# Patient Record
Sex: MALE | Race: WHITE | NOT HISPANIC OR LATINO | Employment: FULL TIME | URBAN - METROPOLITAN AREA
[De-identification: names, ages, dates, MRNs, and addresses within clinical notes are randomized per-mention and may not be internally consistent; named-entity substitution may affect disease eponyms.]

---

## 2019-09-18 ENCOUNTER — OFFICE VISIT (OUTPATIENT)
Dept: URGENT CARE | Facility: CLINIC | Age: 34
End: 2019-09-18
Payer: COMMERCIAL

## 2019-09-18 VITALS
SYSTOLIC BLOOD PRESSURE: 104 MMHG | BODY MASS INDEX: 21.48 KG/M2 | HEART RATE: 110 BPM | OXYGEN SATURATION: 100 % | HEIGHT: 69 IN | TEMPERATURE: 99 F | DIASTOLIC BLOOD PRESSURE: 70 MMHG | RESPIRATION RATE: 18 BRPM | WEIGHT: 145 LBS

## 2019-09-18 DIAGNOSIS — M62.838 MUSCLE SPASM: ICD-10-CM

## 2019-09-18 DIAGNOSIS — M62.838 CERVICAL PARASPINAL MUSCLE SPASM: Primary | ICD-10-CM

## 2019-09-18 PROCEDURE — 3008F BODY MASS INDEX DOCD: CPT | Mod: CPTII,S$GLB,, | Performed by: NURSE PRACTITIONER

## 2019-09-18 PROCEDURE — 99203 OFFICE O/P NEW LOW 30 MIN: CPT | Mod: 25,S$GLB,, | Performed by: NURSE PRACTITIONER

## 2019-09-18 PROCEDURE — 99203 PR OFFICE/OUTPT VISIT, NEW, LEVL III, 30-44 MIN: ICD-10-PCS | Mod: 25,S$GLB,, | Performed by: NURSE PRACTITIONER

## 2019-09-18 PROCEDURE — 96372 THER/PROPH/DIAG INJ SC/IM: CPT | Mod: S$GLB,,, | Performed by: NURSE PRACTITIONER

## 2019-09-18 PROCEDURE — 3008F PR BODY MASS INDEX (BMI) DOCUMENTED: ICD-10-PCS | Mod: CPTII,S$GLB,, | Performed by: NURSE PRACTITIONER

## 2019-09-18 PROCEDURE — 96372 PR INJECTION,THERAP/PROPH/DIAG2ST, IM OR SUBCUT: ICD-10-PCS | Mod: S$GLB,,, | Performed by: NURSE PRACTITIONER

## 2019-09-18 RX ORDER — METHOCARBAMOL 750 MG/1
750 TABLET, FILM COATED ORAL 2 TIMES DAILY PRN
Qty: 30 TABLET | Refills: 0 | Status: SHIPPED | OUTPATIENT
Start: 2019-09-18 | End: 2019-10-03

## 2019-09-18 RX ORDER — KETOROLAC TROMETHAMINE 30 MG/ML
30 INJECTION, SOLUTION INTRAMUSCULAR; INTRAVENOUS
Status: COMPLETED | OUTPATIENT
Start: 2019-09-18 | End: 2019-09-18

## 2019-09-18 RX ORDER — NAPROXEN 500 MG/1
500 TABLET ORAL 2 TIMES DAILY WITH MEALS
Qty: 20 TABLET | Refills: 0 | Status: ON HOLD | OUTPATIENT
Start: 2019-09-18 | End: 2019-09-22 | Stop reason: HOSPADM

## 2019-09-18 RX ADMIN — KETOROLAC TROMETHAMINE 30 MG: 30 INJECTION, SOLUTION INTRAMUSCULAR; INTRAVENOUS at 04:09

## 2019-09-18 NOTE — PATIENT INSTRUCTIONS
INCREASE FLUID INTAKE   MUSCLE RELAXER MAY CAUSE YOU TO BE DROWSY    You must understand that you've received an Urgent Care treatment only and that you may be released before all your medical problems are known or treated. You, the patient, will arrange for follow up care as instructed.  If your condition worsens we recommend that you receive another evaluation at the emergency room immediately or contact your primary medical clinics after hours call service to discuss your concerns.  Please return here or go to the Emergency Department for any concerns or worsening of condition.     Muscle Spasm  A muscle spasm is a sudden tightening of the muscle you cant control. This may be caused by strain, overworking the muscle, or injury. It can also be caused by dehydration, electrolyte imbalance, diabetes, alcohol use, and certain medicines. If it goes on long enough the muscle spasm causes pain. Common areas for muscle spasm are the legs, neck, and back.  Home care  · Heat, massage, and stretching will help relax muscle spasm.  · When the spasm is in your arm or leg, stretch the muscle passively. To do this, have someone bend or straighten the joint above or below the muscle until you feel the stretch on the sore muscle. You can stretch the muscle actively by moving the affected body part. This will stretch the muscle that is in spasm. For example, if the spasm is in your calf, bend the ankle so your toes point upward toward your knee. This will stretch your calf muscle.  · You may use over-the-counter pain medicine to control pain, unless another medicine was prescribed. If you have chronic liver or kidney disease or ever had a stomach ulcer or GI bleeding, talk with your healthcare provider before using these medicines.  Follow-up care  Follow up with your healthcare provider, or as advised.    When to seek medical advice  Call your healthcare provider right away if any of the following occur:  · Fingers or toes  become swollen, cold, blue, numb, or tingly  · You develop weakness in the affected arm or leg  · Pain increases and is not controlled by the above measures  Date Last Reviewed: 11/21/2015  © 2797-8873 [employer]. [address] [place] [address], [place] [address]. All rights reserved. This information is not intended as a substitute for professional medical care. Always follow your healthcare professional's instructions.

## 2019-09-18 NOTE — PROGRESS NOTES
"Subjective:       Patient ID: Eleazar Casillas is a 33 y.o. male.    Vitals:  height is 5' 9" (1.753 m) and weight is 65.8 kg (145 lb). His temperature is 98.6 °F (37 °C). His blood pressure is 104/70 and his pulse is 110. His respiration is 18 and oxygen saturation is 100%.     Chief Complaint: Shoulder Pain    Left shoulder pain, started on Monday, no trauma  This is a 33-year-old male presents today with complaints of left shoulder pain. He states his noticed his bicep twitching and feels very tense.  Patient states pain is constant but worsens intermittently.  He was seen by his chiropractor yesterday for this pain, x-rays were performed.  X-ray was normal.  Patient was told pain was from a strain of his shoulder.  Denies any or tingling.  Denies any recent heavy lifting or straining.  Denies any known trauma.    Shoulder Pain    The pain is present in the left shoulder. This is a new problem. The current episode started in the past 7 days. The problem occurs constantly. The problem has been unchanged. The quality of the pain is described as burning and sharp. The pain is at a severity of 10/10. The pain is severe. Pertinent negatives include no fever or headaches. The symptoms are aggravated by activity. He has tried acetaminophen and OTC pain meds for the symptoms. The treatment provided mild relief.       Constitution: Negative for chills, fatigue and fever.   HENT: Negative for congestion and sore throat.    Neck: Negative for painful lymph nodes.   Cardiovascular: Negative for chest pain and leg swelling.   Eyes: Negative for double vision and blurred vision.   Respiratory: Negative for cough and shortness of breath.    Gastrointestinal: Negative for nausea, vomiting and diarrhea.   Genitourinary: Negative for dysuria, frequency and urgency.   Musculoskeletal: Negative for joint pain, joint swelling, muscle cramps and muscle ache.   Skin: Negative for color change, pale, rash and erythema. "   Allergic/Immunologic: Negative for seasonal allergies.   Neurological: Negative for dizziness, history of vertigo, light-headedness, passing out and headaches.   Hematologic/Lymphatic: Negative for swollen lymph nodes, easy bruising/bleeding and history of blood clots. Does not bruise/bleed easily.   Psychiatric/Behavioral: Negative for nervous/anxious, sleep disturbance and depression. The patient is not nervous/anxious.        Objective:      Physical Exam   Constitutional: He is oriented to person, place, and time. He appears well-developed and well-nourished.   Patient sitting uncomfortably in chair   HENT:   Head: Normocephalic and atraumatic. Head is without abrasion, without contusion and without laceration.   Right Ear: External ear normal.   Left Ear: External ear normal.   Nose: Nose normal.   Mouth/Throat: Oropharynx is clear and moist.   Eyes: Pupils are equal, round, and reactive to light. Conjunctivae, EOM and lids are normal.   Neck: Trachea normal, full passive range of motion without pain and phonation normal. Neck supple.   Cardiovascular: Normal rate, regular rhythm and normal heart sounds.   Pulmonary/Chest: Effort normal and breath sounds normal. No stridor. No respiratory distress.   Musculoskeletal: Normal range of motion.        Left shoulder: He exhibits tenderness, pain and spasm.        Cervical back: He exhibits tenderness, pain and spasm. He exhibits normal range of motion, no bony tenderness, no swelling, no edema, no deformity, no laceration and normal pulse.        Back:         Arms:  5/5 hand strength  Radial pulse +2  Cap refill <3 seconds   Neurological: He is alert and oriented to person, place, and time.   Skin: Skin is warm, dry and intact. Capillary refill takes less than 2 seconds. No abrasion, no bruising, no burn, no ecchymosis, no laceration, no lesion and no rash noted. No erythema.   No lesions, bruising, or ecchymosis   Psychiatric: He has a normal mood and affect.  His speech is normal and behavior is normal. Judgment and thought content normal. Cognition and memory are normal.   Nursing note and vitals reviewed.      Assessment:       1. Cervical paraspinal muscle spasm    2. Muscle spasm        Plan:         Cervical paraspinal muscle spasm  -     ketorolac injection 30 mg  -     naproxen (NAPROSYN) 500 MG tablet; Take 1 tablet (500 mg total) by mouth 2 (two) times daily with meals. for 10 days  Dispense: 20 tablet; Refill: 0  -     methocarbamol (ROBAXIN) 750 MG Tab; Take 1 tablet (750 mg total) by mouth 2 (two) times daily as needed.  Dispense: 30 tablet; Refill: 0    Muscle spasm            Patient Instructions   INCREASE FLUID INTAKE   MUSCLE RELAXER MAY CAUSE YOU TO BE DROWSY    You must understand that you've received an Urgent Care treatment only and that you may be released before all your medical problems are known or treated. You, the patient, will arrange for follow up care as instructed.  If your condition worsens we recommend that you receive another evaluation at the emergency room immediately or contact your primary medical clinics after hours call service to discuss your concerns.  Please return here or go to the Emergency Department for any concerns or worsening of condition.     Muscle Spasm  A muscle spasm is a sudden tightening of the muscle you cant control. This may be caused by strain, overworking the muscle, or injury. It can also be caused by dehydration, electrolyte imbalance, diabetes, alcohol use, and certain medicines. If it goes on long enough the muscle spasm causes pain. Common areas for muscle spasm are the legs, neck, and back.  Home care  · Heat, massage, and stretching will help relax muscle spasm.  · When the spasm is in your arm or leg, stretch the muscle passively. To do this, have someone bend or straighten the joint above or below the muscle until you feel the stretch on the sore muscle. You can stretch the muscle actively by moving  the affected body part. This will stretch the muscle that is in spasm. For example, if the spasm is in your calf, bend the ankle so your toes point upward toward your knee. This will stretch your calf muscle.  · You may use over-the-counter pain medicine to control pain, unless another medicine was prescribed. If you have chronic liver or kidney disease or ever had a stomach ulcer or GI bleeding, talk with your healthcare provider before using these medicines.  Follow-up care  Follow up with your healthcare provider, or as advised.    When to seek medical advice  Call your healthcare provider right away if any of the following occur:  · Fingers or toes become swollen, cold, blue, numb, or tingly  · You develop weakness in the affected arm or leg  · Pain increases and is not controlled by the above measures  Date Last Reviewed: 11/21/2015  © 2969-4364 [employer]. [address] [place] [address]. All rights reserved. This information is not intended as a substitute for professional medical care. Always follow your healthcare professional's instructions.

## 2019-09-19 ENCOUNTER — TELEPHONE (OUTPATIENT)
Dept: URGENT CARE | Facility: CLINIC | Age: 34
End: 2019-09-19

## 2019-09-19 NOTE — TELEPHONE ENCOUNTER
Patient called stating that he is still having really bad spasms to his left shoulder. He states the muscle relaxer's help momentarily but come back quickly.  He has also been increasing his fluid intake and electrolytes without improvement.  Discussed case with Dr. Soni and recommends patient go to the ER for further workup of his symptoms.  Discussed plan of care with patient and he verbalizes understanding.

## 2019-09-20 ENCOUNTER — HOSPITAL ENCOUNTER (INPATIENT)
Facility: HOSPITAL | Age: 34
LOS: 2 days | Discharge: HOME | DRG: 473 | End: 2019-09-22
Attending: EMERGENCY MEDICINE | Admitting: NEUROLOGICAL SURGERY
Payer: COMMERCIAL

## 2019-09-20 DIAGNOSIS — M50.222 HERNIATION OF INTERVERTEBRAL DISC AT C5-C6 LEVEL: ICD-10-CM

## 2019-09-20 DIAGNOSIS — Z01.818 PREOPERATIVE CLEARANCE: ICD-10-CM

## 2019-09-20 DIAGNOSIS — G95.19: Primary | ICD-10-CM

## 2019-09-20 DIAGNOSIS — M50.30 BULGING OF CERVICAL INTERVERTEBRAL DISC: ICD-10-CM

## 2019-09-20 DIAGNOSIS — M62.838 CERVICAL PARASPINAL MUSCLE SPASM: ICD-10-CM

## 2019-09-20 DIAGNOSIS — Z72.0 TOBACCO ABUSE: ICD-10-CM

## 2019-09-20 DIAGNOSIS — M50.20 PROTRUDED CERVICAL DISC: ICD-10-CM

## 2019-09-20 DIAGNOSIS — M62.838 MUSCLE SPASM: ICD-10-CM

## 2019-09-20 LAB
ABO + RH BLD: NORMAL
ANION GAP SERPL CALC-SCNC: 8 MMOL/L (ref 8–16)
APTT BLDCRRT: 26.1 SEC (ref 21–32)
BASOPHILS # BLD AUTO: 0.11 K/UL (ref 0–0.2)
BASOPHILS NFR BLD: 1.5 % (ref 0–1.9)
BLD GP AB SCN CELLS X3 SERPL QL: NORMAL
BLOOD GROUP ANTIBODIES SERPL: NORMAL
BUN SERPL-MCNC: 14 MG/DL (ref 6–20)
CALCIUM SERPL-MCNC: 9.4 MG/DL (ref 8.7–10.5)
CHLORIDE SERPL-SCNC: 106 MMOL/L (ref 95–110)
CO2 SERPL-SCNC: 26 MMOL/L (ref 23–29)
CREAT SERPL-MCNC: 1 MG/DL (ref 0.5–1.4)
CRP SERPL-MCNC: 0.5 MG/L (ref 0–8.2)
DIFFERENTIAL METHOD: ABNORMAL
EOSINOPHIL # BLD AUTO: 0.6 K/UL (ref 0–0.5)
EOSINOPHIL NFR BLD: 8.4 % (ref 0–8)
ERYTHROCYTE [DISTWIDTH] IN BLOOD BY AUTOMATED COUNT: 13.6 % (ref 11.5–14.5)
ERYTHROCYTE [SEDIMENTATION RATE] IN BLOOD BY WESTERGREN METHOD: 4 MM/HR (ref 0–23)
EST. GFR  (AFRICAN AMERICAN): >60 ML/MIN/1.73 M^2
EST. GFR  (NON AFRICAN AMERICAN): >60 ML/MIN/1.73 M^2
GLUCOSE SERPL-MCNC: 108 MG/DL (ref 70–110)
HCT VFR BLD AUTO: 45.5 % (ref 40–54)
HGB BLD-MCNC: 15.1 G/DL (ref 14–18)
IMM GRANULOCYTES # BLD AUTO: 0.02 K/UL (ref 0–0.04)
IMM GRANULOCYTES NFR BLD AUTO: 0.3 % (ref 0–0.5)
INR PPP: 1 (ref 0.8–1.2)
LYMPHOCYTES # BLD AUTO: 2 K/UL (ref 1–4.8)
LYMPHOCYTES NFR BLD: 28 % (ref 18–48)
MCH RBC QN AUTO: 29.2 PG (ref 27–31)
MCHC RBC AUTO-ENTMCNC: 33.2 G/DL (ref 32–36)
MCV RBC AUTO: 88 FL (ref 82–98)
MONOCYTES # BLD AUTO: 0.5 K/UL (ref 0.3–1)
MONOCYTES NFR BLD: 6.6 % (ref 4–15)
NEUTROPHILS # BLD AUTO: 3.9 K/UL (ref 1.8–7.7)
NEUTROPHILS NFR BLD: 55.2 % (ref 38–73)
NRBC BLD-RTO: 0 /100 WBC
PLATELET # BLD AUTO: 221 K/UL (ref 150–350)
PMV BLD AUTO: 10.6 FL (ref 9.2–12.9)
POTASSIUM SERPL-SCNC: 4.6 MMOL/L (ref 3.5–5.1)
PROTHROMBIN TIME: 10.7 SEC (ref 9–12.5)
RBC # BLD AUTO: 5.18 M/UL (ref 4.6–6.2)
SODIUM SERPL-SCNC: 140 MMOL/L (ref 136–145)
WBC # BLD AUTO: 7.14 K/UL (ref 3.9–12.7)

## 2019-09-20 PROCEDURE — 20600001 HC STEP DOWN PRIVATE ROOM

## 2019-09-20 PROCEDURE — 99285 EMERGENCY DEPT VISIT HI MDM: CPT | Mod: ,,, | Performed by: EMERGENCY MEDICINE

## 2019-09-20 PROCEDURE — 25000003 PHARM REV CODE 250: Performed by: EMERGENCY MEDICINE

## 2019-09-20 PROCEDURE — 63600175 PHARM REV CODE 636 W HCPCS: Performed by: PHYSICIAN ASSISTANT

## 2019-09-20 PROCEDURE — 80048 BASIC METABOLIC PNL TOTAL CA: CPT

## 2019-09-20 PROCEDURE — 99254 PR INITIAL INPATIENT CONSULT,LEVL IV: ICD-10-PCS | Mod: ,,, | Performed by: PHYSICIAN ASSISTANT

## 2019-09-20 PROCEDURE — 85610 PROTHROMBIN TIME: CPT

## 2019-09-20 PROCEDURE — 96375 TX/PRO/DX INJ NEW DRUG ADDON: CPT

## 2019-09-20 PROCEDURE — 99285 PR EMERGENCY DEPT VISIT,LEVEL V: ICD-10-PCS | Mod: ,,, | Performed by: EMERGENCY MEDICINE

## 2019-09-20 PROCEDURE — 99254 IP/OBS CNSLTJ NEW/EST MOD 60: CPT | Mod: ,,, | Performed by: PHYSICIAN ASSISTANT

## 2019-09-20 PROCEDURE — 63600175 PHARM REV CODE 636 W HCPCS: Performed by: EMERGENCY MEDICINE

## 2019-09-20 PROCEDURE — 99285 EMERGENCY DEPT VISIT HI MDM: CPT | Mod: 25

## 2019-09-20 PROCEDURE — 86901 BLOOD TYPING SEROLOGIC RH(D): CPT

## 2019-09-20 PROCEDURE — 96374 THER/PROPH/DIAG INJ IV PUSH: CPT

## 2019-09-20 PROCEDURE — 85025 COMPLETE CBC W/AUTO DIFF WBC: CPT

## 2019-09-20 PROCEDURE — 85652 RBC SED RATE AUTOMATED: CPT

## 2019-09-20 PROCEDURE — 86870 RBC ANTIBODY IDENTIFICATION: CPT

## 2019-09-20 PROCEDURE — 93005 ELECTROCARDIOGRAM TRACING: CPT

## 2019-09-20 PROCEDURE — 85730 THROMBOPLASTIN TIME PARTIAL: CPT

## 2019-09-20 PROCEDURE — 86140 C-REACTIVE PROTEIN: CPT

## 2019-09-20 PROCEDURE — 93010 EKG 12-LEAD: ICD-10-PCS | Mod: ,,, | Performed by: INTERNAL MEDICINE

## 2019-09-20 PROCEDURE — 93010 ELECTROCARDIOGRAM REPORT: CPT | Mod: ,,, | Performed by: INTERNAL MEDICINE

## 2019-09-20 PROCEDURE — 25000003 PHARM REV CODE 250: Performed by: PHYSICIAN ASSISTANT

## 2019-09-20 RX ORDER — IBUPROFEN 200 MG
16 TABLET ORAL
Status: DISCONTINUED | OUTPATIENT
Start: 2019-09-20 | End: 2019-09-22 | Stop reason: HOSPADM

## 2019-09-20 RX ORDER — MORPHINE SULFATE 4 MG/ML
1 INJECTION, SOLUTION INTRAMUSCULAR; INTRAVENOUS EVERY 6 HOURS PRN
Status: DISCONTINUED | OUTPATIENT
Start: 2019-09-20 | End: 2019-09-21

## 2019-09-20 RX ORDER — IBUPROFEN 200 MG
24 TABLET ORAL
Status: DISCONTINUED | OUTPATIENT
Start: 2019-09-20 | End: 2019-09-22 | Stop reason: HOSPADM

## 2019-09-20 RX ORDER — HYDROCODONE BITARTRATE AND ACETAMINOPHEN 5; 325 MG/1; MG/1
1 TABLET ORAL EVERY 4 HOURS PRN
Status: DISCONTINUED | OUTPATIENT
Start: 2019-09-20 | End: 2019-09-21

## 2019-09-20 RX ORDER — DIAZEPAM 5 MG/1
5 TABLET ORAL
Status: COMPLETED | OUTPATIENT
Start: 2019-09-20 | End: 2019-09-20

## 2019-09-20 RX ORDER — KETOROLAC TROMETHAMINE 30 MG/ML
30 INJECTION, SOLUTION INTRAMUSCULAR; INTRAVENOUS
Status: COMPLETED | OUTPATIENT
Start: 2019-09-20 | End: 2019-09-20

## 2019-09-20 RX ORDER — NICOTINE 7MG/24HR
1 PATCH, TRANSDERMAL 24 HOURS TRANSDERMAL DAILY
Status: DISCONTINUED | OUTPATIENT
Start: 2019-09-21 | End: 2019-09-22 | Stop reason: HOSPADM

## 2019-09-20 RX ORDER — GLUCAGON 1 MG
1 KIT INJECTION
Status: DISCONTINUED | OUTPATIENT
Start: 2019-09-20 | End: 2019-09-22 | Stop reason: HOSPADM

## 2019-09-20 RX ORDER — BUPRENORPHINE HYDROCHLORIDE AND NALOXONE HYDROCHLORIDE DIHYDRATE 8; 2 MG/1; MG/1
TABLET SUBLINGUAL EVERY 6 HOURS PRN
COMMUNITY
End: 2024-03-06

## 2019-09-20 RX ORDER — ONDANSETRON 2 MG/ML
4 INJECTION INTRAMUSCULAR; INTRAVENOUS
Status: COMPLETED | OUTPATIENT
Start: 2019-09-20 | End: 2019-09-20

## 2019-09-20 RX ORDER — AMOXICILLIN 250 MG
2 CAPSULE ORAL NIGHTLY PRN
Status: DISCONTINUED | OUTPATIENT
Start: 2019-09-20 | End: 2019-09-22 | Stop reason: HOSPADM

## 2019-09-20 RX ORDER — SODIUM CHLORIDE 9 MG/ML
INJECTION, SOLUTION INTRAVENOUS CONTINUOUS
Status: DISCONTINUED | OUTPATIENT
Start: 2019-09-21 | End: 2019-09-22 | Stop reason: HOSPADM

## 2019-09-20 RX ORDER — ONDANSETRON 2 MG/ML
4 INJECTION INTRAMUSCULAR; INTRAVENOUS EVERY 6 HOURS PRN
Status: DISCONTINUED | OUTPATIENT
Start: 2019-09-20 | End: 2019-09-22 | Stop reason: HOSPADM

## 2019-09-20 RX ORDER — HYDROMORPHONE HYDROCHLORIDE 1 MG/ML
0.5 INJECTION, SOLUTION INTRAMUSCULAR; INTRAVENOUS; SUBCUTANEOUS
Status: COMPLETED | OUTPATIENT
Start: 2019-09-20 | End: 2019-09-20

## 2019-09-20 RX ORDER — CYCLOBENZAPRINE HCL 5 MG
5 TABLET ORAL 3 TIMES DAILY PRN
Status: DISCONTINUED | OUTPATIENT
Start: 2019-09-20 | End: 2019-09-21

## 2019-09-20 RX ORDER — ACETAMINOPHEN 325 MG/1
650 TABLET ORAL EVERY 4 HOURS PRN
Status: DISCONTINUED | OUTPATIENT
Start: 2019-09-20 | End: 2019-09-22 | Stop reason: HOSPADM

## 2019-09-20 RX ADMIN — HYDROMORPHONE HYDROCHLORIDE 0.5 MG: 1 INJECTION, SOLUTION INTRAMUSCULAR; INTRAVENOUS; SUBCUTANEOUS at 05:09

## 2019-09-20 RX ADMIN — DIAZEPAM 5 MG: 5 TABLET ORAL at 09:09

## 2019-09-20 RX ADMIN — KETOROLAC TROMETHAMINE 30 MG: 30 INJECTION, SOLUTION INTRAMUSCULAR; INTRAVENOUS at 09:09

## 2019-09-20 RX ADMIN — MORPHINE SULFATE 1 MG: 4 INJECTION INTRAVENOUS at 09:09

## 2019-09-20 RX ADMIN — HYDROCODONE BITARTRATE AND ACETAMINOPHEN 1 TABLET: 5; 325 TABLET ORAL at 07:09

## 2019-09-20 RX ADMIN — CYCLOBENZAPRINE HYDROCHLORIDE 5 MG: 5 TABLET, FILM COATED ORAL at 07:09

## 2019-09-20 RX ADMIN — HYDROMORPHONE HYDROCHLORIDE 0.5 MG: 1 INJECTION, SOLUTION INTRAMUSCULAR; INTRAVENOUS; SUBCUTANEOUS at 03:09

## 2019-09-20 RX ADMIN — ONDANSETRON 4 MG: 2 INJECTION INTRAMUSCULAR; INTRAVENOUS at 03:09

## 2019-09-20 NOTE — ASSESSMENT & PLAN NOTE
34 y/o M with no significant PMH who present with 4 days of L-sided neck and arm pain, found to have C5-6 disc herniation and associated cord signal change.    -MRI C-spine reviewed: L paracentral disc herniation causing moderate spinal cord effacement with associated cord signal change. Straightening of cervical spine also noted.  -Admit patient to Neurosurgery  -No emergent neurosurgical intervention at this time. However, patient would benefit from cervical decompression in the near future due to the fact that he is already experiencing functional loss and with signs of cord edema.  -Neuro checks and vital signs q4h  -Preop labs, CXR, and EKG ordered.  -Regular diet now, NPO at midnight  -Pain control: Norco PRN, morphine IV for pain not relieved by PO medication  -Muscle spasms: Flexeril PRN  -Tobacco Use: Nicotine patch ordered  -Plan discussed with patient and family at bedside at length, voiced understanding and agreement to plan for admission and possible surgical intervention in near future. All questions were answered.  -Discussed with Dr. Morris

## 2019-09-20 NOTE — SUBJECTIVE & OBJECTIVE
(Not in a hospital admission)    Review of patient's allergies indicates:  No Known Allergies    History reviewed. No pertinent past medical history.  History reviewed. No pertinent surgical history.  Family History     None        Tobacco Use    Smoking status: Current Every Day Smoker     Packs/day: 1.00     Types: Cigarettes    Smokeless tobacco: Never Used   Substance and Sexual Activity    Alcohol use: Not Currently    Drug use: Not Currently     Comment: suboxoe intermittently     Sexual activity: Not on file     Review of Systems   Constitutional: Negative for chills and fever.   Eyes: Negative for photophobia and visual disturbance.   Respiratory: Negative for cough and shortness of breath.    Cardiovascular: Negative for chest pain and palpitations.   Gastrointestinal: Negative for nausea and vomiting.   Genitourinary: Negative for difficulty urinating and dysuria.   Musculoskeletal: Positive for myalgias and neck pain. Negative for arthralgias and gait problem.   Skin: Negative for pallor and rash.   Neurological: Positive for numbness. Negative for tremors, seizures, syncope, weakness and headaches.   Hematological: Negative for adenopathy. Does not bruise/bleed easily.   Psychiatric/Behavioral: Negative for behavioral problems and confusion.     Objective:     Weight: 65.8 kg (145 lb)  Body mass index is 20.81 kg/m².  Vital Signs (Most Recent):  Temp: 97.3 °F (36.3 °C) (09/20/19 1605)  Pulse: 85 (09/20/19 1605)  Resp: 18 (09/20/19 1110)  BP: 108/61 (09/20/19 1605)  SpO2: 100 % (09/20/19 1605) Vital Signs (24h Range):  Temp:  [97.3 °F (36.3 °C)] 97.3 °F (36.3 °C)  Pulse:  [] 85  Resp:  [18] 18  SpO2:  [100 %] 100 %  BP: ()/(59-72) 108/61                          Neurosurgery Physical Exam    General: well developed, well nourished, no distress.   Head: normocephalic, atraumatic  Neurologic: Alert and oriented. Thought content appropriate.  GCS: Motor: 6/Verbal: 5/Eyes: 4 GCS Total:  15  Mental Status: Awake, Alert, Oriented x 4  Language: No aphasia  Speech: No dysarthria  Cranial nerves: face symmetric, tongue midline, CN II-XII grossly intact.   Eyes: pupils equal, round, reactive to light with accomodation, EOMI.   Pulmonary: normal respirations, no signs of respiratory distress  Abdomen: soft, non-distended, not tender to palpation  Skin: Skin is warm, dry and intact.  Sensory: Decreased sensation to L first three digits, o/w intact to light touch throughout    Motor Strength: Moves all extremities spontaneously with good tone. L bicep 4+/5, o/w full strength upper and lower extremities. No abnormal movements seen.     Strength  Deltoids Triceps Biceps Wrist Extension Wrist Flexion Hand    Upper: R 5/5 5/5 5/5 5/5 5/5 5/5    L 5/5 5/5 4+/5 5/5 5/5 5/5     Iliopsoas Quadriceps Knee  Flexion Tibialis  anterior Gastro- cnemius EHL   Lower: R 5/5 5/5 5/5 5/5 5/5 5/5    L 5/5 5/5 5/5 5/5 5/5 5/5     Interosseous muscles: L 4+/5, R 5/5. Exam pain limited.    Reflexes:   DTR: 2+ symmetrically throughout.  Cartagena's: Negative.  Clonus: Negative.     Gait stable, fluid.      Cervical:   Increased tension of left cervical paraspinal musculature  Midline TTP: Negative.      Significant Labs:  Recent Labs   Lab 09/20/19  0946         K 4.6      CO2 26   BUN 14   CREATININE 1.0   CALCIUM 9.4     Recent Labs   Lab 09/20/19  0946   WBC 7.14   HGB 15.1   HCT 45.5        No results for input(s): LABPT, INR, APTT in the last 48 hours.  Microbiology Results (last 7 days)     ** No results found for the last 168 hours. **        Recent Lab Results       09/20/19  0946        Anion Gap 8     Baso # 0.11     Basophil% 1.5     BUN, Bld 14     Calcium 9.4     Chloride 106     CO2 26     Creatinine 1.0     CRP 0.5     Differential Method Automated     eGFR if African American >60.0     eGFR if non  >60.0  Comment:  Calculation used to obtain the estimated glomerular  filtration  rate (eGFR) is the CKD-EPI equation.        Eos # 0.6     Eosinophil% 8.4     Glucose 108     Gran # (ANC) 3.9     Gran% 55.2     Hematocrit 45.5     Hemoglobin 15.1     Immature Grans (Abs) 0.02  Comment:  Mild elevation in immature granulocytes is non specific and   can be seen in a variety of conditions including stress response,   acute inflammation, trauma and pregnancy. Correlation with other   laboratory and clinical findings is essential.       Immature Granulocytes 0.3     Lymph # 2.0     Lymph% 28.0     MCH 29.2     MCHC 33.2     MCV 88     Mono # 0.5     Mono% 6.6     MPV 10.6     nRBC 0     Platelets 221     Potassium 4.6     RBC 5.18     RDW 13.6     Sed Rate 4     Sodium 140     WBC 7.14         All pertinent labs from the last 24 hours have been reviewed.    Significant Diagnostics:  MRI Cervical Spine w/o Contrast 9/20/2019:  Impression       Broad-based disc osteophyte complex with superimposed large left paracentral disc extrusion at C5-6 resulting in moderate spinal canal stenosis and suspected spinal cord edema, as well as moderate to severe bilateral neural foraminal narrowing.       I have reviewed and interpreted all pertinent imaging results/findings within the past 24 hours.

## 2019-09-20 NOTE — ED TRIAGE NOTES
"Patient states left neck, shoulder arm pain  onset Monday. Denies injury. States thumb and first finger "numb and tingling" saw Chiro Monday, Urgent care Tuesday with rx given. Suboxone 2 days PTA  "

## 2019-09-20 NOTE — HPI
Patient is a 34 y/o M with no significant PMHx who presented to the ED with left sided neck and arm pain that began 4 days PTA. He awoke with a stabbing pain in the left side of the neck after some heavy lifting over the weekend, denies any trauma. Pain began to radiate down the left lateral upper arm, medial forearm, and into the first three digits. He has associated tingling of his first three fingers. He denies weakness, bowel/bladder dysfunction, saddle anesthesia, and shuffling gait. He feels that he is off balance due to compensating for left-sided pain. He went to the chiropractor, no manipulation was done. Also went to urgent care Wednesday, received Toradol injection and prescribed Robaxin with little relief. MRI was obtained per ED, which showed C5/6 disc bulge and cord signal change. Neurosurgery was consulted. Patient denies any other medical issues, no previous surgeries, and takes no medications at home except occasional low dose suboxone.

## 2019-09-20 NOTE — PLAN OF CARE
Nurse paged MELINA on call, reported patient's brother in law is requesting patient be discharged after speaking with friend who is a Neurosurgeon and would like to follow up on Monday in the private practice Neurosurgery clinic. Patient's brother in law stated he did not want residents involved in the patient's care while in the OR. We reiterated that we recommended the patient be admitted for observation and pain control. We discussed the risks of worsening of symptoms including further loss of function or paralysis if a fall or other injury were to occur. Patient's mother also present, all voiced understanding of risks of leaving AMA. At end of conversation, patient's brother in law stated he would like patient to be admitted with plan to discuss surgical intervention in the morning. All questions were answered. Continue plan for admission to neurosurgical floor. Instructed to call for any worsening weakness, paresthesias, or concerning symptoms.     ER nurse [facility] and Neurosurgery BRITTANI Siegel present for conversation.    Caroline Batres PA-C  Pager: [phone]  [facility]

## 2019-09-20 NOTE — ED PROVIDER NOTES
Encounter Date: 9/20/2019    SCRIBE #1 NOTE: I, Constanza De La Rosa, am scribing for, and in the presence of,  Tevin Campo MD. I have scribed the following portions of the note - Other sections scribed: LEVY APODACA.       History     Chief Complaint   Patient presents with    Neck Pain     woke up monday neck and L arm spasm, seen to  wed, chirio tues, meds not working     Time patient was seen by the provider: 9:30 AM      The patient is a 33 y.o. male with no significant PMHx, who presents to the ED with a complaint of constant, shooting base of the neck pain that radiates to the lateral aspect of his biceps that began 4 days ago. He also reports a tingling sensation to the tips of his first to third digits on his left upper extremity that began yesterday. Patient reports pain is alleviated when he leans his body to the left or when he raises his left arm behind his head. Denies weakness, numbness of face, double vision, nausea, vomiting, fever, chills, chest pain, shortness of breath, urinary or bowel incontinence. He reports his pain started 4 days ago but was resolved after working. However, the next morning, it had resume and had been constant since. He was seen by a chiropractor that morning, had an xray done, and was told it was a strain. Yesterday, the patient reports pain was too severe and went to an Urgent care and was given Toradol, naproxen and robaxin with slight relief. Patient is right hand dominant.     The history is provided by the patient.     Review of patient's allergies indicates:  No Known Allergies  History reviewed. No pertinent past medical history.  History reviewed. No pertinent surgical history.  History reviewed. No pertinent family history.  Social History     Tobacco Use    Smoking status: Current Every Day Smoker     Packs/day: 1.00     Types: Cigarettes    Smokeless tobacco: Never Used   Substance Use Topics    Alcohol use: Not Currently    Drug use: Not Currently      Comment: suboxoe intermittently      Review of Systems   Constitutional: Negative for fever.   HENT: Negative for sore throat and trouble swallowing.    Respiratory: Negative for shortness of breath.    Cardiovascular: Negative for chest pain.   Gastrointestinal: Negative for nausea.   Genitourinary: Negative for dysuria.   Musculoskeletal: Positive for myalgias and neck pain (radiates to his left lateral bicep). Negative for back pain.   Skin: Negative for rash.   Neurological: Positive for weakness and numbness.        Tingling to his left fingers   Hematological: Does not bruise/bleed easily.       Physical Exam     Initial Vitals [09/20/19 0906]   BP Pulse Resp Temp SpO2   132/72 109 18 97.3 °F (36.3 °C) 100 %      MAP       --         Physical Exam    Constitutional: He is cooperative. He appears ill.   HENT:   Head: Normocephalic and atraumatic.   Eyes: Conjunctivae and EOM are normal.   Neck: Normal range of motion. Neck supple.   Cardiovascular: Normal rate, regular rhythm and normal heart sounds.   Pulmonary/Chest: No accessory muscle usage. No tachypnea. No respiratory distress.   Abdominal: He exhibits no distension. There is no tenderness.   Musculoskeletal:        Left shoulder: He exhibits decreased range of motion.        Cervical back: He exhibits decreased range of motion and tenderness.   Neurological: He is alert. No cranial nerve deficit. GCS eye subscore is 4. GCS verbal subscore is 5. GCS motor subscore is 6.   Decreased strength of left arm shoulder due to pain         ED Course   Procedures  Labs Reviewed - No data to display       Imaging Results    None       X-Rays:   Independently Interpreted Readings:   Other Readings:  MRI of cervical spine showed a disc bulge at C5-6 and also showed at that area cord edema    Medical Decision Making:   History:   Old Medical Records: I decided to obtain old medical records.  Old Records Summarized: records from clinic visits.       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 He does get relief with putting his left hand behind his neck. I feel that this could be from his cervical spine, nerve root 5-8. Will do labs, including inflammatory markers, and get MRI of his neck. Plan to give IM Toradol and Valium PO.   Clinical Tests:   Lab Tests: Ordered and Reviewed  Radiological Study: Ordered and Reviewed  Other:   I have discussed this case with another health care provider.       <> Summary of the Discussion: Consult to Neurosurgery            Scribe Attestation:   Scribe #1: I performed the above scribed service and the documentation accurately describes the services I performed. I attest to the accuracy of the note.    Attending Attestation:             Attending ED Notes:   Patient evaluated because of severe left neck and shoulder discomfort with some mild lateral upper arm discomfort and tingling of the tips of thumb index finger and middle finger we are very concerned about the possibility of a who acute this difficulty patient had blood work drawn and an MRI was performed of the cervical spine showing a significant disc bulge of C5-6 there is also associated cord edema at that region and this could explain why the patient is having significant difficulty we consulted Neurosurgery and they are seeing the patient presently.  The pain had become much worse and he needed a 2 doses of Dilaudid 5 mg  Patient evaluated by neurosurgery and they will admit the patient to their service for probable surgery tomorrow.             Clinical Impression:       ICD-10-CM ICD-9-CM   1. Edema, spinal cord G95.19 336.1   2. Preoperative clearance Z01.818 V72.84   3.  Bulging of cervical intervertebral disc M50.20 722.0   4. Protruded cervical disc M50.20 722.0   5. Herniation of intervertebral disc at C5-C6 level M50.222 722.0   6. Muscle spasm M62.838 728.85   7. Tobacco abuse Z72.0 305.1   8. Cervical paraspinal muscle spasm M62.838 728.85         Disposition:   Disposition: Admitted  Condition: Serious                        Tevin Campo MD  09/23/19 3069

## 2019-09-20 NOTE — H&P
See consult note by Caroline Batres on 9/20/19 for full H&P.    Caroline Batres PA-C  Pager: [phone]  [facility]

## 2019-09-20 NOTE — CONSULTS
[facility]  Neurosurgery  Consult Note    Inpatient consult to Neurosurgery  Consult performed by: Caroline Batres PA-C  Consult ordered by: Tevin Campo MD        Subjective:     Chief Complaint/Reason for Admission: Neck pain/C5-6 disc herniation    History of Present Illness: Patient is a 32 y/o M with no significant PMHx who presented to the ED with left sided neck and arm pain that began 4 days PTA. He awoke with a stabbing pain in the left side of the neck after some heavy lifting over the weekend, denies any trauma. Pain began to radiate down the left lateral upper arm, medial forearm, and into the first three digits. He has associated tingling of his first three fingers. He denies weakness, bowel/bladder dysfunction, saddle anesthesia, and shuffling gait. He feels that he is off balance due to compensating for left-sided pain. He went to the chiropractor, no manipulation was done. Also went to urgent care Wednesday, received Toradol injection and prescribed Robaxin with little relief. MRI was obtained per ED, which showed C5/6 disc bulge and cord signal change. Neurosurgery was consulted. Patient denies any other medical issues, no previous surgeries, and takes no medications at home except occasional low dose suboxone.       (Not in a hospital admission)    Review of patient's allergies indicates:  No Known Allergies    History reviewed. No pertinent past medical history.  History reviewed. No pertinent surgical history.  Family History     None        Tobacco Use    Smoking status: Current Every Day Smoker     Packs/day: 1.00     Types: Cigarettes    Smokeless tobacco: Never Used   Substance and Sexual Activity    Alcohol use: Not Currently    Drug use: Not Currently     Comment: suboxoe intermittently     Sexual activity: Not on file     Review of Systems   Constitutional: Negative for chills and fever.   Eyes: Negative for photophobia and visual disturbance.    Respiratory: Negative for cough and shortness of breath.    Cardiovascular: Negative for chest pain and palpitations.   Gastrointestinal: Negative for nausea and vomiting.   Genitourinary: Negative for difficulty urinating and dysuria.   Musculoskeletal: Positive for myalgias and neck pain. Negative for arthralgias and gait problem.   Skin: Negative for pallor and rash.   Neurological: Positive for numbness. Negative for tremors, seizures, syncope, weakness and headaches.   Hematological: Negative for adenopathy. Does not bruise/bleed easily.   Psychiatric/Behavioral: Negative for behavioral problems and confusion.     Objective:     Weight: 65.8 kg (145 lb)  Body mass index is 20.81 kg/m².  Vital Signs (Most Recent):  Temp: 97.3 °F (36.3 °C) (09/20/19 1605)  Pulse: 85 (09/20/19 1605)  Resp: 18 (09/20/19 1110)  BP: 108/61 (09/20/19 1605)  SpO2: 100 % (09/20/19 1605) Vital Signs (24h Range):  Temp:  [97.3 °F (36.3 °C)] 97.3 °F (36.3 °C)  Pulse:  [] 85  Resp:  [18] 18  SpO2:  [100 %] 100 %  BP: ()/(59-72) 108/61                          Neurosurgery Physical Exam    General: well developed, well nourished, no distress.   Head: normocephalic, atraumatic  Neurologic: Alert and oriented. Thought content appropriate.  GCS: Motor: 6/Verbal: 5/Eyes: 4 GCS Total: 15  Mental Status: Awake, Alert, Oriented x 4  Language: No aphasia  Speech: No dysarthria  Cranial nerves: face symmetric, tongue midline, CN II-XII grossly intact.   Eyes: pupils equal, round, reactive to light with accomodation, EOMI.   Pulmonary: normal respirations, no signs of respiratory distress  Abdomen: soft, non-distended, not tender to palpation  Skin: Skin is warm, dry and intact.  Sensory: Decreased sensation to L first three digits, o/w intact to light touch throughout    Motor Strength: Moves all extremities spontaneously with good tone. L bicep 4+/5, o/w full strength upper and lower extremities. No abnormal movements seen.      Strength  Deltoids Triceps Biceps Wrist Extension Wrist Flexion Hand    Upper: R 5/5 5/5 5/5 5/5 5/5 5/5    L 5/5 5/5 4+/5 5/5 5/5 5/5     Iliopsoas Quadriceps Knee  Flexion Tibialis  anterior Gastro- cnemius EHL   Lower: R 5/5 5/5 5/5 5/5 5/5 5/5    L 5/5 5/5 5/5 5/5 5/5 5/5     Interosseous muscles: L 4+/5, R 5/5. Exam pain limited.    Reflexes:   DTR: 2+ symmetrically throughout.  Cartagena's: Negative.  Clonus: Negative.     Gait stable, fluid.      Cervical:   Increased tension of left cervical paraspinal musculature  Midline TTP: Negative.      Significant Labs:  Recent Labs   Lab 09/20/19  0946         K 4.6      CO2 26   BUN 14   CREATININE 1.0   CALCIUM 9.4     Recent Labs   Lab 09/20/19  0946   WBC 7.14   HGB 15.1   HCT 45.5        No results for input(s): LABPT, INR, APTT in the last 48 hours.  Microbiology Results (last 7 days)     ** No results found for the last 168 hours. **        Recent Lab Results       09/20/19  0946        Anion Gap 8     Baso # 0.11     Basophil% 1.5     BUN, Bld 14     Calcium 9.4     Chloride 106     CO2 26     Creatinine 1.0     CRP 0.5     Differential Method Automated     eGFR if African American >60.0     eGFR if non  >60.0  Comment:  Calculation used to obtain the estimated glomerular filtration  rate (eGFR) is the CKD-EPI equation.        Eos # 0.6     Eosinophil% 8.4     Glucose 108     Gran # (ANC) 3.9     Gran% 55.2     Hematocrit 45.5     Hemoglobin 15.1     Immature Grans (Abs) 0.02  Comment:  Mild elevation in immature granulocytes is non specific and   can be seen in a variety of conditions including stress response,   acute inflammation, trauma and pregnancy. Correlation with other   laboratory and clinical findings is essential.       Immature Granulocytes 0.3     Lymph # 2.0     Lymph% 28.0     MCH 29.2     MCHC 33.2     MCV 88     Mono # 0.5     Mono% 6.6     MPV 10.6     nRBC 0     Platelets 221     Potassium  4.6     RBC 5.18     RDW 13.6     Sed Rate 4     Sodium 140     WBC 7.14         All pertinent labs from the last 24 hours have been reviewed.    Significant Diagnostics:  MRI Cervical Spine w/o Contrast 9/20/2019:  Impression       Broad-based disc osteophyte complex with superimposed large left paracentral disc extrusion at C5-6 resulting in moderate spinal canal stenosis and suspected spinal cord edema, as well as moderate to severe bilateral neural foraminal narrowing.       I have reviewed and interpreted all pertinent imaging results/findings within the past 24 hours.    Assessment/Plan:     Herniation of intervertebral disc at C5-C6 level  32 y/o M with no significant PMH who present with 4 days of L-sided neck and arm pain, found to have C5-6 disc herniation and associated cord signal change.    -MRI C-spine reviewed: L paracentral disc herniation causing moderate spinal cord effacement with associated cord signal change. Straightening of cervical spine also noted.  -Admit patient to Neurosurgery  -No emergent neurosurgical intervention at this time. However, patient would benefit from cervical decompression in the near future due to the fact that he is already experiencing functional loss and with signs of cord edema.  -Neuro checks and vital signs q4h  -Preop labs, CXR, and EKG ordered.  -Regular diet now, NPO at midnight  -Pain control: Norco PRN, morphine IV for pain not relieved by PO medication  -Muscle spasms: Flexeril PRN  -Tobacco Use: Nicotine patch ordered  -Plan discussed with patient and family at bedside at length, voiced understanding and agreement to plan for admission and possible surgical intervention in near future. All questions were answered.  -Discussed with Dr. Morris        Thank you for your consult. I will follow-up with patient. Please contact us if you have any additional questions.    Caroline Batres PA-C  Neurosurgery  [facility]

## 2019-09-21 ENCOUNTER — ANESTHESIA (OUTPATIENT)
Dept: SURGERY | Facility: HOSPITAL | Age: 34
DRG: 473 | End: 2019-09-21
Payer: COMMERCIAL

## 2019-09-21 ENCOUNTER — ANESTHESIA EVENT (OUTPATIENT)
Dept: SURGERY | Facility: HOSPITAL | Age: 34
DRG: 473 | End: 2019-09-21
Payer: COMMERCIAL

## 2019-09-21 PROBLEM — R20.2 NUMBNESS AND TINGLING IN LEFT HAND: Status: ACTIVE | Noted: 2019-09-21

## 2019-09-21 PROBLEM — M50.20 CERVICAL HERNIATED DISC: Status: ACTIVE | Noted: 2019-09-20

## 2019-09-21 PROBLEM — R20.0 NUMBNESS AND TINGLING IN LEFT HAND: Status: ACTIVE | Noted: 2019-09-21

## 2019-09-21 PROBLEM — M54.12 RADICULOPATHY OF CERVICAL SPINE: Status: ACTIVE | Noted: 2019-09-21

## 2019-09-21 PROBLEM — M54.12 LEFT CERVICAL RADICULOPATHY: Status: ACTIVE | Noted: 2019-09-21

## 2019-09-21 LAB
ANION GAP SERPL CALC-SCNC: 8 MMOL/L (ref 8–16)
BASOPHILS # BLD AUTO: 0.07 K/UL (ref 0–0.2)
BASOPHILS NFR BLD: 1.3 % (ref 0–1.9)
BUN SERPL-MCNC: 18 MG/DL (ref 6–20)
CALCIUM SERPL-MCNC: 8.9 MG/DL (ref 8.7–10.5)
CHLORIDE SERPL-SCNC: 105 MMOL/L (ref 95–110)
CO2 SERPL-SCNC: 26 MMOL/L (ref 23–29)
CREAT SERPL-MCNC: 1 MG/DL (ref 0.5–1.4)
DIFFERENTIAL METHOD: ABNORMAL
EOSINOPHIL # BLD AUTO: 0.6 K/UL (ref 0–0.5)
EOSINOPHIL NFR BLD: 10.2 % (ref 0–8)
ERYTHROCYTE [DISTWIDTH] IN BLOOD BY AUTOMATED COUNT: 13.7 % (ref 11.5–14.5)
EST. GFR  (AFRICAN AMERICAN): >60 ML/MIN/1.73 M^2
EST. GFR  (NON AFRICAN AMERICAN): >60 ML/MIN/1.73 M^2
GLUCOSE SERPL-MCNC: 91 MG/DL (ref 70–110)
HCT VFR BLD AUTO: 40.3 % (ref 40–54)
HGB BLD-MCNC: 13.4 G/DL (ref 14–18)
IMM GRANULOCYTES # BLD AUTO: 0.01 K/UL (ref 0–0.04)
IMM GRANULOCYTES NFR BLD AUTO: 0.2 % (ref 0–0.5)
LYMPHOCYTES # BLD AUTO: 2.2 K/UL (ref 1–4.8)
LYMPHOCYTES NFR BLD: 40.4 % (ref 18–48)
MCH RBC QN AUTO: 28.9 PG (ref 27–31)
MCHC RBC AUTO-ENTMCNC: 33.3 G/DL (ref 32–36)
MCV RBC AUTO: 87 FL (ref 82–98)
MONOCYTES # BLD AUTO: 0.5 K/UL (ref 0.3–1)
MONOCYTES NFR BLD: 8.3 % (ref 4–15)
NEUTROPHILS # BLD AUTO: 2.1 K/UL (ref 1.8–7.7)
NEUTROPHILS NFR BLD: 39.6 % (ref 38–73)
NRBC BLD-RTO: 0 /100 WBC
PLATELET # BLD AUTO: 195 K/UL (ref 150–350)
PMV BLD AUTO: 10.8 FL (ref 9.2–12.9)
POTASSIUM SERPL-SCNC: 3.9 MMOL/L (ref 3.5–5.1)
RBC # BLD AUTO: 4.63 M/UL (ref 4.6–6.2)
SODIUM SERPL-SCNC: 139 MMOL/L (ref 136–145)
WBC # BLD AUTO: 5.4 K/UL (ref 3.9–12.7)

## 2019-09-21 PROCEDURE — 71000033 HC RECOVERY, INTIAL HOUR: Performed by: NEUROLOGICAL SURGERY

## 2019-09-21 PROCEDURE — 63600175 PHARM REV CODE 636 W HCPCS: Performed by: NURSE ANESTHETIST, CERTIFIED REGISTERED

## 2019-09-21 PROCEDURE — C1769 GUIDE WIRE: HCPCS | Performed by: NEUROLOGICAL SURGERY

## 2019-09-21 PROCEDURE — C1713 ANCHOR/SCREW BN/BN,TIS/BN: HCPCS | Performed by: NEUROLOGICAL SURGERY

## 2019-09-21 PROCEDURE — D9220A PRA ANESTHESIA: ICD-10-PCS | Mod: CRNA,,, | Performed by: NURSE ANESTHETIST, CERTIFIED REGISTERED

## 2019-09-21 PROCEDURE — 25000003 PHARM REV CODE 250: Performed by: STUDENT IN AN ORGANIZED HEALTH CARE EDUCATION/TRAINING PROGRAM

## 2019-09-21 PROCEDURE — 99233 PR SUBSEQUENT HOSPITAL CARE,LEVL III: ICD-10-PCS | Mod: ,,, | Performed by: NEUROLOGICAL SURGERY

## 2019-09-21 PROCEDURE — D9220A PRA ANESTHESIA: Mod: CRNA,,, | Performed by: NURSE ANESTHETIST, CERTIFIED REGISTERED

## 2019-09-21 PROCEDURE — 25000003 PHARM REV CODE 250: Performed by: NEUROLOGICAL SURGERY

## 2019-09-21 PROCEDURE — 36000711: Performed by: NEUROLOGICAL SURGERY

## 2019-09-21 PROCEDURE — 22853 INSJ BIOMECHANICAL DEVICE: CPT | Mod: AS,,, | Performed by: PHYSICIAN ASSISTANT

## 2019-09-21 PROCEDURE — 22551 ARTHRD ANT NTRBDY CERVICAL: CPT | Mod: AS,,, | Performed by: PHYSICIAN ASSISTANT

## 2019-09-21 PROCEDURE — 36000710: Performed by: NEUROLOGICAL SURGERY

## 2019-09-21 PROCEDURE — S4991 NICOTINE PATCH NONLEGEND: HCPCS | Performed by: PHYSICIAN ASSISTANT

## 2019-09-21 PROCEDURE — 20600001 HC STEP DOWN PRIVATE ROOM

## 2019-09-21 PROCEDURE — 22551 PR ARTHRODESIS ANT INTERBODY INC DISCECTOMY, CERVICAL BELOW C2: ICD-10-PCS | Mod: AS,,, | Performed by: PHYSICIAN ASSISTANT

## 2019-09-21 PROCEDURE — 85025 COMPLETE CBC W/AUTO DIFF WBC: CPT

## 2019-09-21 PROCEDURE — 80048 BASIC METABOLIC PNL TOTAL CA: CPT

## 2019-09-21 PROCEDURE — 37000008 HC ANESTHESIA 1ST 15 MINUTES: Performed by: NEUROLOGICAL SURGERY

## 2019-09-21 PROCEDURE — 27800903 OPTIME MED/SURG SUP & DEVICES OTHER IMPLANTS: Performed by: NEUROLOGICAL SURGERY

## 2019-09-21 PROCEDURE — D9220A PRA ANESTHESIA: ICD-10-PCS | Mod: ANES,,, | Performed by: ANESTHESIOLOGY

## 2019-09-21 PROCEDURE — 22853 INSJ BIOMECHANICAL DEVICE: CPT | Mod: ,,, | Performed by: NEUROLOGICAL SURGERY

## 2019-09-21 PROCEDURE — 37000009 HC ANESTHESIA EA ADD 15 MINS: Performed by: NEUROLOGICAL SURGERY

## 2019-09-21 PROCEDURE — 63600175 PHARM REV CODE 636 W HCPCS: Performed by: ANESTHESIOLOGY

## 2019-09-21 PROCEDURE — 20930 SP BONE ALGRFT MORSEL ADD-ON: CPT | Mod: ,,, | Performed by: NEUROLOGICAL SURGERY

## 2019-09-21 PROCEDURE — 63600175 PHARM REV CODE 636 W HCPCS: Performed by: STUDENT IN AN ORGANIZED HEALTH CARE EDUCATION/TRAINING PROGRAM

## 2019-09-21 PROCEDURE — 22551 PR ARTHRODESIS ANT INTERBODY INC DISCECTOMY, CERVICAL BELOW C2: ICD-10-PCS | Mod: ,,, | Performed by: NEUROLOGICAL SURGERY

## 2019-09-21 PROCEDURE — 27201423 OPTIME MED/SURG SUP & DEVICES STERILE SUPPLY: Performed by: NEUROLOGICAL SURGERY

## 2019-09-21 PROCEDURE — 22551 ARTHRD ANT NTRBDY CERVICAL: CPT | Mod: ,,, | Performed by: NEUROLOGICAL SURGERY

## 2019-09-21 PROCEDURE — 22853 PR INSERT BIOMECH DEV W/INTERBODY ARTHRODESIS, EA CONTIGUOUS DEFECT: ICD-10-PCS | Mod: AS,,, | Performed by: PHYSICIAN ASSISTANT

## 2019-09-21 PROCEDURE — 36415 COLL VENOUS BLD VENIPUNCTURE: CPT

## 2019-09-21 PROCEDURE — 20930 PR ALLOGRAFT FOR SPINE SURGERY ONLY MORSELIZED: ICD-10-PCS | Mod: ,,, | Performed by: NEUROLOGICAL SURGERY

## 2019-09-21 PROCEDURE — 25000003 PHARM REV CODE 250: Performed by: NURSE ANESTHETIST, CERTIFIED REGISTERED

## 2019-09-21 PROCEDURE — 63600175 PHARM REV CODE 636 W HCPCS: Performed by: PHYSICIAN ASSISTANT

## 2019-09-21 PROCEDURE — 22853 PR INSERT BIOMECH DEV W/INTERBODY ARTHRODESIS, EA CONTIGUOUS DEFECT: ICD-10-PCS | Mod: ,,, | Performed by: NEUROLOGICAL SURGERY

## 2019-09-21 PROCEDURE — 25000003 PHARM REV CODE 250: Performed by: PHYSICIAN ASSISTANT

## 2019-09-21 PROCEDURE — 99233 SBSQ HOSP IP/OBS HIGH 50: CPT | Mod: ,,, | Performed by: NEUROLOGICAL SURGERY

## 2019-09-21 PROCEDURE — D9220A PRA ANESTHESIA: Mod: ANES,,, | Performed by: ANESTHESIOLOGY

## 2019-09-21 DEVICE — IMPLANTABLE DEVICE: Type: IMPLANTABLE DEVICE | Site: SPINE CERVICAL | Status: FUNCTIONAL

## 2019-09-21 DEVICE — ANCHOR COALITION MIS 12MM: Type: IMPLANTABLE DEVICE | Site: SPINE CERVICAL | Status: FUNCTIONAL

## 2019-09-21 DEVICE — ALLOGRAFT TRIFECTA 1CC: Type: IMPLANTABLE DEVICE | Site: SPINE CERVICAL | Status: FUNCTIONAL

## 2019-09-21 RX ORDER — DIAZEPAM 5 MG/1
5 TABLET ORAL EVERY 6 HOURS PRN
Status: DISCONTINUED | OUTPATIENT
Start: 2019-09-21 | End: 2019-09-22 | Stop reason: HOSPADM

## 2019-09-21 RX ORDER — FENTANYL CITRATE 50 UG/ML
INJECTION, SOLUTION INTRAMUSCULAR; INTRAVENOUS
Status: DISCONTINUED | OUTPATIENT
Start: 2019-09-21 | End: 2019-09-21

## 2019-09-21 RX ORDER — HYDROMORPHONE HYDROCHLORIDE 1 MG/ML
0.2 INJECTION, SOLUTION INTRAMUSCULAR; INTRAVENOUS; SUBCUTANEOUS EVERY 5 MIN PRN
Status: COMPLETED | OUTPATIENT
Start: 2019-09-21 | End: 2019-09-21

## 2019-09-21 RX ORDER — BACITRACIN 50000 [IU]/1
INJECTION, POWDER, FOR SOLUTION INTRAMUSCULAR
Status: DISCONTINUED | OUTPATIENT
Start: 2019-09-21 | End: 2019-09-21 | Stop reason: HOSPADM

## 2019-09-21 RX ORDER — PHENYLEPHRINE HCL IN 0.9% NACL 1 MG/10 ML
SYRINGE (ML) INTRAVENOUS
Status: DISCONTINUED | OUTPATIENT
Start: 2019-09-21 | End: 2019-09-21

## 2019-09-21 RX ORDER — MEPERIDINE HYDROCHLORIDE 50 MG/ML
12.5 INJECTION INTRAMUSCULAR; INTRAVENOUS; SUBCUTANEOUS EVERY 10 MIN PRN
Status: DISCONTINUED | OUTPATIENT
Start: 2019-09-21 | End: 2019-09-21 | Stop reason: HOSPADM

## 2019-09-21 RX ORDER — PROPOFOL 10 MG/ML
VIAL (ML) INTRAVENOUS
Status: DISCONTINUED | OUTPATIENT
Start: 2019-09-21 | End: 2019-09-21

## 2019-09-21 RX ORDER — ONDANSETRON 2 MG/ML
INJECTION INTRAMUSCULAR; INTRAVENOUS
Status: DISCONTINUED | OUTPATIENT
Start: 2019-09-21 | End: 2019-09-21

## 2019-09-21 RX ORDER — OXYCODONE HYDROCHLORIDE 10 MG/1
10 TABLET ORAL EVERY 4 HOURS PRN
Status: DISCONTINUED | OUTPATIENT
Start: 2019-09-21 | End: 2019-09-22 | Stop reason: HOSPADM

## 2019-09-21 RX ORDER — PROPOFOL 10 MG/ML
VIAL (ML) INTRAVENOUS CONTINUOUS PRN
Status: DISCONTINUED | OUTPATIENT
Start: 2019-09-21 | End: 2019-09-21

## 2019-09-21 RX ORDER — DIAZEPAM 10 MG/2ML
2.5 INJECTION INTRAMUSCULAR
Status: DISCONTINUED | OUTPATIENT
Start: 2019-09-21 | End: 2019-09-21 | Stop reason: HOSPADM

## 2019-09-21 RX ORDER — ROCURONIUM BROMIDE 10 MG/ML
INJECTION, SOLUTION INTRAVENOUS
Status: DISCONTINUED | OUTPATIENT
Start: 2019-09-21 | End: 2019-09-21

## 2019-09-21 RX ORDER — FENTANYL CITRATE 50 UG/ML
25 INJECTION, SOLUTION INTRAMUSCULAR; INTRAVENOUS EVERY 5 MIN PRN
Status: DISCONTINUED | OUTPATIENT
Start: 2019-09-21 | End: 2019-09-21 | Stop reason: HOSPADM

## 2019-09-21 RX ORDER — METOCLOPRAMIDE HYDROCHLORIDE 5 MG/ML
10 INJECTION INTRAMUSCULAR; INTRAVENOUS EVERY 10 MIN PRN
Status: DISCONTINUED | OUTPATIENT
Start: 2019-09-21 | End: 2019-09-21 | Stop reason: HOSPADM

## 2019-09-21 RX ORDER — OXYCODONE HYDROCHLORIDE 5 MG/1
TABLET ORAL
Status: DISPENSED
Start: 2019-09-21 | End: 2019-09-22

## 2019-09-21 RX ORDER — HYDROMORPHONE HYDROCHLORIDE 1 MG/ML
1 INJECTION, SOLUTION INTRAMUSCULAR; INTRAVENOUS; SUBCUTANEOUS
Status: DISCONTINUED | OUTPATIENT
Start: 2019-09-21 | End: 2019-09-22 | Stop reason: HOSPADM

## 2019-09-21 RX ORDER — MIDAZOLAM HYDROCHLORIDE 1 MG/ML
INJECTION, SOLUTION INTRAMUSCULAR; INTRAVENOUS
Status: DISCONTINUED | OUTPATIENT
Start: 2019-09-21 | End: 2019-09-21

## 2019-09-21 RX ORDER — DEXAMETHASONE SODIUM PHOSPHATE 4 MG/ML
INJECTION, SOLUTION INTRA-ARTICULAR; INTRALESIONAL; INTRAMUSCULAR; INTRAVENOUS; SOFT TISSUE
Status: DISCONTINUED | OUTPATIENT
Start: 2019-09-21 | End: 2019-09-21

## 2019-09-21 RX ORDER — LIDOCAINE HYDROCHLORIDE 20 MG/ML
INJECTION, SOLUTION EPIDURAL; INFILTRATION; INTRACAUDAL; PERINEURAL
Status: DISCONTINUED | OUTPATIENT
Start: 2019-09-21 | End: 2019-09-21

## 2019-09-21 RX ORDER — FAMOTIDINE 20 MG/1
20 TABLET, FILM COATED ORAL 2 TIMES DAILY
Status: DISCONTINUED | OUTPATIENT
Start: 2019-09-21 | End: 2019-09-22 | Stop reason: HOSPADM

## 2019-09-21 RX ORDER — LIDOCAINE HYDROCHLORIDE AND EPINEPHRINE 10; 10 MG/ML; UG/ML
INJECTION, SOLUTION INFILTRATION; PERINEURAL
Status: DISCONTINUED | OUTPATIENT
Start: 2019-09-21 | End: 2019-09-21

## 2019-09-21 RX ADMIN — FENTANYL CITRATE 50 MCG: 50 INJECTION INTRAMUSCULAR; INTRAVENOUS at 02:09

## 2019-09-21 RX ADMIN — HYDROMORPHONE HYDROCHLORIDE 0.2 MG: 1 INJECTION, SOLUTION INTRAMUSCULAR; INTRAVENOUS; SUBCUTANEOUS at 06:09

## 2019-09-21 RX ADMIN — PROPOFOL 170 MG: 10 INJECTION, EMULSION INTRAVENOUS at 02:09

## 2019-09-21 RX ADMIN — HYDROMORPHONE HYDROCHLORIDE 0.2 MG: 1 INJECTION, SOLUTION INTRAMUSCULAR; INTRAVENOUS; SUBCUTANEOUS at 05:09

## 2019-09-21 RX ADMIN — CYCLOBENZAPRINE HYDROCHLORIDE 5 MG: 5 TABLET, FILM COATED ORAL at 10:09

## 2019-09-21 RX ADMIN — SODIUM CHLORIDE: 0.9 INJECTION, SOLUTION INTRAVENOUS at 02:09

## 2019-09-21 RX ADMIN — PROPOFOL 50 MG: 10 INJECTION, EMULSION INTRAVENOUS at 04:09

## 2019-09-21 RX ADMIN — SODIUM CHLORIDE, SODIUM GLUCONATE, SODIUM ACETATE, POTASSIUM CHLORIDE, MAGNESIUM CHLORIDE, SODIUM PHOSPHATE, DIBASIC, AND POTASSIUM PHOSPHATE: .53; .5; .37; .037; .03; .012; .00082 INJECTION, SOLUTION INTRAVENOUS at 03:09

## 2019-09-21 RX ADMIN — PROPOFOL 150 MCG/KG/MIN: 10 INJECTION, EMULSION INTRAVENOUS at 02:09

## 2019-09-21 RX ADMIN — ONDANSETRON 4 MG: 2 INJECTION INTRAMUSCULAR; INTRAVENOUS at 04:09

## 2019-09-21 RX ADMIN — NICOTINE 7 MG/24 HR DAILY TRANSDERMAL PATCH 1 PATCH: at 09:09

## 2019-09-21 RX ADMIN — CYCLOBENZAPRINE HYDROCHLORIDE 5 MG: 5 TABLET, FILM COATED ORAL at 04:09

## 2019-09-21 RX ADMIN — REMIFENTANIL HYDROCHLORIDE 0.1 MCG/KG/MIN: 1 INJECTION, POWDER, LYOPHILIZED, FOR SOLUTION INTRAVENOUS at 02:09

## 2019-09-21 RX ADMIN — MORPHINE SULFATE 1 MG: 4 INJECTION INTRAVENOUS at 07:09

## 2019-09-21 RX ADMIN — FAMOTIDINE 20 MG: 20 TABLET ORAL at 09:09

## 2019-09-21 RX ADMIN — SODIUM CHLORIDE, SODIUM GLUCONATE, SODIUM ACETATE, POTASSIUM CHLORIDE, MAGNESIUM CHLORIDE, SODIUM PHOSPHATE, DIBASIC, AND POTASSIUM PHOSPHATE: .53; .5; .37; .037; .03; .012; .00082 INJECTION, SOLUTION INTRAVENOUS at 02:09

## 2019-09-21 RX ADMIN — SODIUM CHLORIDE 0.1 MCG/KG/MIN: 9 INJECTION, SOLUTION INTRAVENOUS at 04:09

## 2019-09-21 RX ADMIN — FAMOTIDINE 20 MG: 20 TABLET ORAL at 12:09

## 2019-09-21 RX ADMIN — Medication 100 MCG: at 03:09

## 2019-09-21 RX ADMIN — OXYCODONE HYDROCHLORIDE 10 MG: 10 TABLET ORAL at 05:09

## 2019-09-21 RX ADMIN — OXYCODONE HYDROCHLORIDE 10 MG: 10 TABLET ORAL at 09:09

## 2019-09-21 RX ADMIN — LIDOCAINE HYDROCHLORIDE 100 MG: 20 INJECTION, SOLUTION EPIDURAL; INFILTRATION; INTRACAUDAL; PERINEURAL at 02:09

## 2019-09-21 RX ADMIN — ROCURONIUM BROMIDE 30 MG: 10 INJECTION, SOLUTION INTRAVENOUS at 02:09

## 2019-09-21 RX ADMIN — MIDAZOLAM HYDROCHLORIDE 2 MG: 1 INJECTION, SOLUTION INTRAMUSCULAR; INTRAVENOUS at 02:09

## 2019-09-21 RX ADMIN — DIAZEPAM 5 MG: 5 TABLET ORAL at 12:09

## 2019-09-21 RX ADMIN — SODIUM CHLORIDE: 0.9 INJECTION, SOLUTION INTRAVENOUS at 12:09

## 2019-09-21 RX ADMIN — FENTANYL CITRATE 50 MCG: 50 INJECTION INTRAMUSCULAR; INTRAVENOUS at 03:09

## 2019-09-21 RX ADMIN — HYDROMORPHONE HYDROCHLORIDE 1 MG: 1 INJECTION, SOLUTION INTRAMUSCULAR; INTRAVENOUS; SUBCUTANEOUS at 11:09

## 2019-09-21 RX ADMIN — DEXTROSE 2 G: 50 INJECTION, SOLUTION INTRAVENOUS at 03:09

## 2019-09-21 RX ADMIN — HYDROCODONE BITARTRATE AND ACETAMINOPHEN 1 TABLET: 5; 325 TABLET ORAL at 09:09

## 2019-09-21 RX ADMIN — DEXAMETHASONE SODIUM PHOSPHATE 8 MG: 4 INJECTION, SOLUTION INTRAMUSCULAR; INTRAVENOUS at 04:09

## 2019-09-21 RX ADMIN — HYDROCODONE BITARTRATE AND ACETAMINOPHEN 1 TABLET: 5; 325 TABLET ORAL at 04:09

## 2019-09-21 NOTE — SUBJECTIVE & OBJECTIVE
Interval History: Patient c/o significant pain this AM, worsening L hand numbness. To OR today for disc replacement.     Medications:  Continuous Infusions:   sodium chloride 0.9% Stopped (09/21/19 1521)     Scheduled Meds:   famotidine  20 mg Oral BID    nicotine  1 patch Transdermal Daily     PRN Meds:acetaminophen, bacitracin, Dextrose 10% Bolus, Dextrose 10% Bolus, diazePAM, glucagon (human recombinant), glucose, glucose, glucose, HYDROcodone-acetaminophen, HYDROmorphone, lidocaine-EPINEPHrine 1%-1:100,000, ondansetron, senna-docusate 8.6-50 mg     Review of Systems  Objective:     Weight: 55.2 kg (121 lb 11.1 oz)  Body mass index is 17.97 kg/m².  Vital Signs (Most Recent):  Temp: 97.4 °F (36.3 °C) (09/21/19 1120)  Pulse: 76 (09/21/19 1120)  Resp: 18 (09/21/19 1120)  BP: 107/71 (09/21/19 1120)  SpO2: 97 % (09/21/19 1120) Vital Signs (24h Range):  Temp:  [97.4 °F (36.3 °C)-98 °F (36.7 °C)] 97.4 °F (36.3 °C)  Pulse:  [60-81] 76  Resp:  [17-18] 18  SpO2:  [97 %-100 %] 97 %  BP: (102-124)/(58-71) 107/71     Date 09/21/19 0700 - 09/22/19 0659   Shift 3588-7838 3960-0621 9182-6946 24 Hour Total   INTAKE   P.O. 80   80   I.V.(mL/kg) 887.5(16.1) 700(12.7)  1587.5(28.8)   Shift Total(mL/kg) 967.5(17.5) 700(12.7)  1667.5(30.2)   OUTPUT   Shift Total(mL/kg)       Weight (kg) 55.2 55.2 55.2 55.2                        Neurosurgery Physical Exam   General: well developed, well nourished, mild distress  Head: normocephalic, atraumatic  Neurologic: Alert and oriented. Thought content appropriate.  GCS: Motor: 6/Verbal: 5/Eyes: 4 GCS Total: 15  Mental Status: Awake, Alert, Oriented x 4  Cranial nerves: face symmetric, tongue midline, CN II-XII grossly intact.   Eyes: pupils equal, round, reactive to light with accomodation, EOMI.   Pulmonary: normal respirations, no signs of respiratory distress  Abdomen: soft, non-distended, not tender to palpation  Skin: Skin is warm, dry and intact.  Sensory: Decreased sensation to L  first three digits, o/w intact to light touch throughout     Motor Strength: Moves all extremities spontaneously with good tone. L bicep 4+/5, o/w full strength upper and lower extremities. No abnormal movements seen.      Strength   Deltoids Triceps Biceps Wrist Extension Wrist Flexion Hand    Upper: R 5/5 5/5 5/5 5/5 5/5 5/5     L 5/5 5/5 4+/5 5/5 5/5 5/5       Iliopsoas Quadriceps Knee  Flexion Tibialis  anterior Gastro- cnemius EHL   Lower: R 5/5 5/5 5/5 5/5 5/5 5/5     L 5/5 5/5 5/5 5/5 5/5 5/5      Interosseous muscles: L 4+/5, R 5/5. Exam pain limited.     Reflexes:   DTR: 2+ symmetrically throughout.  Cartagena's: Negative.    Significant Labs:  Recent Labs   Lab 09/20/19  0946 09/21/19  0330    91    139   K 4.6 3.9    105   CO2 26 26   BUN 14 18   CREATININE 1.0 1.0   CALCIUM 9.4 8.9     Recent Labs   Lab 09/20/19  0946 09/21/19  0330   WBC 7.14 5.40   HGB 15.1 13.4*   HCT 45.5 40.3    195     Recent Labs   Lab 09/20/19  1723   INR 1.0   APTT 26.1       Significant Diagnostics:  Ct Cervical Spine Without Contrast    Result Date: 9/21/2019  Degenerative change at C5-6 including a moderate-sized left paracentral disc extrusion as delineated on recent MRI. No significant degenerative change elsewhere in the cervical spine. Electronically signed by: Patrice Daniels MD Date:    09/21/2019 Time:    12:27    X-ray Chest Ap Portable    Result Date: 9/20/2019  I detect no convincing evidence of disease on this single bedside radiograph. Electronically signed by: Jade Cox MD Date:    09/20/2019 Time:    17:22

## 2019-09-21 NOTE — PROGRESS NOTES
Nursing Transfer Note      9/21/2019     Transfer To: XRAY w/ RN then [id number]    Transfer via stretcher    Transfer with cardiac monitoring    Transported by RN x2    Medicines sent: none    Chart send with patient: Yes    Notified: sister waiting in room    Patient reassessed at: to be done by receiving RN (date, time)

## 2019-09-21 NOTE — NURSING
Patient prepared for transfer to surgery via gurney, family is informed of current plan and surgery plans. Patient has signed consents and is aware of plan and in agreement with plan of care.

## 2019-09-21 NOTE — ANESTHESIA PREPROCEDURE EVALUATION
[facility]  Anesthesia Pre-Operative Evaluation         Patient Name: Eleazar Casillas  YOB: 1985  MRN: [id number]    SUBJECTIVE:     Pre-operative evaluation for Procedure(s) (LRB):  REPLACEMENT, INTERVERTEBRAL DISC, CERVICAL, TOTAL;C5/6 (Left)     09/21/2019    Eleazar Casillas is a 33 y.o. male w/ no significant PMHx,smoker presented   with left sided neck and arm pain that began 4 day. MRI was obtained per ED, which showed C5/6 disc bulge and cord signal change. NPO since midnight    Patient now presents for the above procedure(s).      LDA:   20 g RT AC  Prev airway: None documented.    Drips:    sodium chloride 0.9% 75 mL/hr at 09/21/19 0000       Patient Active Problem List   Diagnosis    Preoperative clearance    Bulging of cervical intervertebral disc    Edema, spinal cord    Muscle spasm    Tobacco abuse       Review of patient's allergies indicates:  No Known Allergies    Current Inpatient Medications:   famotidine  20 mg Oral BID    nicotine  1 patch Transdermal Daily       No current facility-administered medications on file prior to encounter.      Current Outpatient Medications on File Prior to Encounter   Medication Sig Dispense Refill    buprenorphine-naloxone 8-2 mg (SUBOXONE) 8-2 mg Subl Place under the tongue every 6 (six) hours as needed.      methocarbamol (ROBAXIN) 750 MG Tab Take 1 tablet (750 mg total) by mouth 2 (two) times daily as needed. 30 tablet 0    naproxen (NAPROSYN) 500 MG tablet Take 1 tablet (500 mg total) by mouth 2 (two) times daily with meals. for 10 days 20 tablet 0       History reviewed. No pertinent surgical history.    Social History     Socioeconomic History    Marital status: Single     Spouse name: Not on file    Number of children: Not on file    Years of education: Not on file    Highest education level: Not on file   Occupational History    Not on file   Social Needs    Financial resource strain: Not on file     Food insecurity:     Worry: Not on file     Inability: Not on file    Transportation needs:     Medical: Not on file     Non-medical: Not on file   Tobacco Use    Smoking status: Current Every Day Smoker     Packs/day: 1.00     Types: Cigarettes    Smokeless tobacco: Never Used   Substance and Sexual Activity    Alcohol use: Not Currently    Drug use: Not Currently     Comment: suboxoe intermittently     Sexual activity: Not on file   Lifestyle    Physical activity:     Days per week: Not on file     Minutes per session: Not on file    Stress: Not on file   Relationships    Social connections:     Talks on phone: Not on file     Gets together: Not on file     Attends Restorationist service: Not on file     Active member of club or organization: Not on file     Attends meetings of clubs or organizations: Not on file     Relationship status: Not on file   Other Topics Concern    Not on file   Social History Narrative    Not on file       OBJECTIVE:     Vital Signs Range (Last 24H):  Temp:  [36.3 °C (97.3 °F)-36.7 °C (98 °F)]   Pulse:  [60-85]   Resp:  [17-18]   BP: (102-124)/(58-71)   SpO2:  [97 %-100 %]       Significant Labs:  Lab Results   Component Value Date    WBC 5.40 09/21/2019    HGB 13.4 (L) 09/21/2019    HCT 40.3 09/21/2019     09/21/2019    CHOL 127 06/08/2004    ALT 33 04/18/2007    AST 25 04/18/2007     09/21/2019    K 3.9 09/21/2019     09/21/2019    CREATININE 1.0 09/21/2019    BUN 18 09/21/2019    CO2 26 09/21/2019    INR 1.0 09/20/2019       Diagnostic Studies:      -MRI C-spine reviewed: L paracentral disc herniation causing moderate spinal cord effacement with associated cord signal change. Straightening of cervical spine also noted.    EKG:   Results for orders placed or performed during the hospital encounter of 09/20/19   EKG 12-lead    Collection Time: 09/20/19  5:42 PM    Narrative    Test Reason : Z01.818,    Vent. Rate : 072 BPM     Atrial Rate : 072 BPM     P-R Int  : 138 ms          QRS Dur : 082 ms      QT Int : 378 ms       P-R-T Axes : 068 081 076 degrees     QTc Int : 413 ms    Normal sinus rhythm  Normal ECG  No previous ECGs available    Referred By: MARE   SELF           Confirmed By:        2D ECHO:  TTE:  No results found for this or any previous visit.    KIERSTEN:  No results found for this or any previous visit.    ASSESSMENT/PLAN:         Anesthesia Evaluation    I have reviewed the Patient Summary Reports.    I have reviewed the Nursing Notes.   I have reviewed the Medications.     Review of Systems  Anesthesia Hx:  Neg history of prior surgery. Family Hx of Anesthesia complications:  Pseudocholinesterase Deficiency, in a aunt/uncle   Denies Personal Hx of Anesthesia complications.   Social:  Non-Smoker    Cardiovascular:   Denies MI.  Denies CAD.    Denies CABG/stent.  Denies Dysrhythmias.   Denies Angina.    Pulmonary:   Denies Pneumonia Denies COPD.  Denies Asthma.  Denies Shortness of breath.    Renal/:   Denies Chronic Renal Disease.     Hepatic/GI:   Denies Liver Disease.    Musculoskeletal:  Spine Disorders: cervical    Neurological:   Denies CVA. Denies Seizures.    Endocrine:  Endocrine Normal        Physical Exam  General:  Well nourished    Airway/Jaw/Neck:  Airway Findings: Mouth Opening: Normal Tongue: Normal  Mallampati: II  Jaw/Neck Findings:  Neck ROM: Extension Painful, Decreased Lateral Motion, to the left     Eyes/Ears/Nose:  EYES/EARS/NOSE FINDINGS: Normal   Dental:  DENTAL FINDINGS: Normal   Chest/Lungs:  Chest/Lungs Findings: Clear to auscultation, Normal Respiratory Rate     Heart/Vascular:  Heart Findings: Rate: Normal  Rhythm: Regular Rhythm  Sounds: Normal     Abdomen:  Abdomen Findings: Normal    Musculoskeletal:  Musculoskeletal Findings: Normal   Skin:  Skin Findings: Normal    Mental Status:  Mental Status Findings: Normal        Anesthesia Plan  Type of Anesthesia, risks & benefits discussed:  Anesthesia Type:  general  Patient's  Preference:   Intra-op Monitoring Plan: standard ASA monitors  Intra-op Monitoring Plan Comments:   Post Op Pain Control Plan: per primary service following discharge from PACU  Post Op Pain Control Plan Comments:   Induction:   IV  Beta Blocker:         Informed Consent: Patient understands risks and agrees with Anesthesia plan.  Questions answered. Anesthesia consent signed with patient.  ASA Score: 2  emergent   Day of Surgery Review of History & Physical:            Ready For Surgery From Anesthesia Perspective.

## 2019-09-21 NOTE — PLAN OF CARE
Problem: Pain Acute  Goal: Optimal Pain Control  Outcome: Ongoing (interventions implemented as appropriate)  Intervention: Develop Pain Management Plan     09/21/19 0340   Prevent or Manage Pain   Pain Management Interventions care clustered;quiet environment facilitated;pain management plan reviewed with patient/caregiver;pillow support provided;position adjusted     Intervention: Prevent or Manage Pain     09/21/19 0340   Prevent or Manage Pain   Sensory Stimulation Regulation care clustered;lighting decreased;quiet environment promoted   Sleep/Rest Enhancement awakenings minimized;family presence promoted;noise level reduced;regular sleep/rest pattern promoted     Intervention: Optimize Psychosocial Wellbeing     09/20/19 1935 09/21/19 0340   Promote Anxiety Reduction   Supportive Measures  --  active listening utilized;positive reinforcement provided   Prevent and Minimize Fear   Diversional Activities television  --

## 2019-09-21 NOTE — ASSESSMENT & PLAN NOTE
34 y/o M with no significant PMH who present with 4 days of L-sided neck and arm pain, found to have C5-6 disc herniation and associated cord signal change.    -MRI C-spine reviewed: L paracentral disc herniation causing moderate spinal cord effacement with associated cord signal change. Straightening of cervical spine also noted.  -To OR for ACDR with Dr. Morris  -Neuro checks and vital signs q4h  -Preop labs, CXR, and EKG reviewed.  -NPO,   -Pain control: Norco PRN, morphine IV for pain not relieved by PO medication  -Muscle spasms: Flexeril PRN  -Tobacco Use: Nicotine patch ordered      Additional recs following surgery.

## 2019-09-21 NOTE — OP NOTE
DATE OF PROCEDURE: 9/21/2019     PREOPERATIVE DIAGNOSES:   1. Acute left C5-6 herniated disc  2. Left C6 radiculopathy  3. Left C6 numbness    POSTOPERATIVE DIAGNOSES:   1. Acute left C5-6 herniated disc  2. Left C6 radiculopathy  3. Left C6 numbness    PROCEDURES PERFORMED:   1. Anterior cervical discectomy and fusion at C5-6  2. Near-total discectomy at C5-6 for placement of interbody PEEK-titanium cage ([employer] MIS)  3. Use of Trifecta allograft ([employer])  4. Use of intraop microscope with microscopic dissection  5. Use of intraop flouroscopy    ATTENDING:  Jeff Morris D.O.    FIRST ASSISTANT:  BRITTANI Dutta    CLINICAL HISTORY AND INDICATIONS FOR SURGERY:   33-year-old male who presented with 4 days of severe left upper extremity radiculopathy with associated numbness and subtle weakness.  He was found to have an acute herniated disc at C5-6 on the left compressing the exiting C6 nerve root.  Discussions were held regarding his treatment options and the original plan was to send him home for a trial of conservative measures including physical therapy and epidural steroid injections.  However his pain was too unbearable and the numbness and tingling in his hand worsened over the morning.  He also had subtle weakness in that arm and decision was made to proceed with an urgent C5-6 ACDF.    Given the patient's symptoms surgery was recommended and after discussing all the risks, benefits, and alternatives, the patient wishes to proceed with surgery.  Consents were obtained.     OPERATIVE PROCEDURE: The patient was correctly identified and brought into the Operating Room where the anesthesia team administered general anesthesia and fiberotic endotracheal intubation. Neuromonitoring electrodes were placed to allow for intraop SSEPs, MEPs and free-running EMGs.  Baseline parameters were present throughout the case. The patient was kept in the supine position with all pressure points padded.  We used  lateral x-ray to localize the C5-6 level and a right transverse incision was planned. Anterior cervical region was prepped and draped using sterile technique.    A timeout was performed and the incision was infiltrated with local anesthetic and incised with a 10-blade scalpel.  Bovie cautery was used to dissect down to the platysma.   A supra- and sub-platysmal dissection was carried out using bovie cautery and a self-retaining retractor was placed for better exposure. We then found the medial edge of the sternocleidomastoid muscle as well as the omohyoid, which was dissected along its fibers for easy mobilization. We then went through the avascular tissue plane until the carotid artery was palpated.  The carotid was maintained laterally and the tracheo-esophageal complex medially.  The prevertebral fascia was dissected off the anterior cervical spine exposing the ALL and longus coli bilaterally. We used a penfield 4 to confirm to correct disc space (C5-6) under lateral flouro. We then used a monopolar cautery to undermine the longus colli bilaterally. We placed Trimline retractors and used a rongeur to remove the anterior osteophyte overlying the C5-6 disk space. We placed Beccaria pin distractors and used a #15 blade to cut into the C5-6 disk space. We distracted the disk spaces and brought in the operating microscope.  A combination of straight and angled curettes was used to perform a near complete diskectomy at C5-6.  The overhanging osteophyte was drilled flush with the superior endplate to gain better visualization and access to the disk space.The posterior opsteophyte was drilled out as well exposing the PLL.  A nerve hook was used to enter the PLL laterally, which was then removed in piecemeal fashion using a combination of Kerrisons 1 and 2.  A complete removal of the PLL was achieved allowing for full decompression of the spinal cord and  nerve roots at C5-6. A nerve hook was used to confirm decompression  of the nerve roots and spinal cord. Floseal was used to get hemostasis within the disc space and an appropriate size trial spacer was placed under flouro guidance.  Once the cage size was deteremined (8 mm) the endplates were final prepared using a straight curette. The cage was the assembled and packed with Trifecta allograft and placed within the C5-6 disc space under flouro guidance.  Once if satisfactory position, the screw anchors were malletted into the vertebral body (one superiorly and one inferiorly).  Position of the cage was checked with AP flouro and the retractor and distractor systems were disassembled.  The longus coli as well as any point of bleeding were cauterized with bipolar cautery.  The site was irrigated with several rounds of antibiotic solution and then closed in layered fashion - with 3-0 Vicryls in the platysma and subcuticular tissue.  Dermabond was use on the skin and an island dressing was applied.      MEPs were present and unchanged at the end of the case    COMPLICATIONS:  None    INCISION:  Right anterolateral neck    WOUND CLASSIFICATION:  Clean    ANESTHESIA: General    ESTIMATED BLOOD LOSS: 20 ml    DRAINS:  None    CONDITION: Stable    PROGNOSIS:  Good

## 2019-09-21 NOTE — TRANSFER OF CARE
"Anesthesia Transfer of Care Note    Patient: Eleazar Casillas    Procedure(s) Performed: Procedure(s):  DISCECTOMY, SPINE, CERVICAL, ANTERIOR APPROACH, WITH FUSION, C5-C6    Patient location: PACU    Anesthesia Type: general    Transport from OR: Transported from OR on 6-10 L/min O2 by face mask with adequate spontaneous ventilation    Post pain: adequate analgesia    Post assessment: no apparent anesthetic complications and tolerated procedure well    Post vital signs: stable    Level of consciousness: sedated    Nausea/Vomiting: no nausea/vomiting    Complications: none    Transfer of care protocol was followed      Last vitals:   Visit Vitals  /71 (BP Location: Left arm, Patient Position: Lying)   Pulse 76   Temp 36.3 °C (97.4 °F) (Oral)   Resp 18   Ht 5' 9" (1.753 m)   Wt 55.2 kg (121 lb 11.1 oz)   SpO2 97%   BMI 17.97 kg/m²     "

## 2019-09-21 NOTE — PROGRESS NOTES
[facility]  Neurosurgery  Progress Note    Subjective:     History of Present Illness: Patient is a 34 y/o M with no significant PMHx who presented to the ED with left sided neck and arm pain that began 4 days PTA. He awoke with a stabbing pain in the left side of the neck after some heavy lifting over the weekend, denies any trauma. Pain began to radiate down the left lateral upper arm, medial forearm, and into the first three digits. He has associated tingling of his first three fingers. He denies weakness, bowel/bladder dysfunction, saddle anesthesia, and shuffling gait. He feels that he is off balance due to compensating for left-sided pain. He went to the chiropractor, no manipulation was done. Also went to urgent care Wednesday, received Toradol injection and prescribed Robaxin with little relief. MRI was obtained per ED, which showed C5/6 disc bulge and cord signal change. Neurosurgery was consulted. Patient denies any other medical issues, no previous surgeries, and takes no medications at home except occasional low dose suboxone.     Post-Op Info:  Procedure(s):  DISCECTOMY, SPINE, CERVICAL, ANTERIOR APPROACH, WITH FUSION, C5-C6   Day of Surgery     Interval History: Patient c/o significant pain this AM, worsening L hand numbness. To OR today for disc replacement.     Medications:  Continuous Infusions:   sodium chloride 0.9% Stopped (09/21/19 1521)     Scheduled Meds:   famotidine  20 mg Oral BID    nicotine  1 patch Transdermal Daily     PRN Meds:acetaminophen, bacitracin, Dextrose 10% Bolus, Dextrose 10% Bolus, diazePAM, glucagon (human recombinant), glucose, glucose, glucose, HYDROcodone-acetaminophen, HYDROmorphone, lidocaine-EPINEPHrine 1%-1:100,000, ondansetron, senna-docusate 8.6-50 mg     Review of Systems  Objective:     Weight: 55.2 kg (121 lb 11.1 oz)  Body mass index is 17.97 kg/m².  Vital Signs (Most Recent):  Temp: 97.4 °F (36.3 °C) (09/21/19 1120)  Pulse: 76 (09/21/19  1120)  Resp: 18 (09/21/19 1120)  BP: 107/71 (09/21/19 1120)  SpO2: 97 % (09/21/19 1120) Vital Signs (24h Range):  Temp:  [97.4 °F (36.3 °C)-98 °F (36.7 °C)] 97.4 °F (36.3 °C)  Pulse:  [60-81] 76  Resp:  [17-18] 18  SpO2:  [97 %-100 %] 97 %  BP: (102-124)/(58-71) 107/71     Date 09/21/19 0700 - 09/22/19 0659   Shift 8109-9854 3412-1744 7830-8809 24 Hour Total   INTAKE   P.O. 80   80   I.V.(mL/kg) 887.5(16.1) 700(12.7)  1587.5(28.8)   Shift Total(mL/kg) 967.5(17.5) 700(12.7)  1667.5(30.2)   OUTPUT   Shift Total(mL/kg)       Weight (kg) 55.2 55.2 55.2 55.2                        Neurosurgery Physical Exam   General: well developed, well nourished, mild distress  Head: normocephalic, atraumatic  Neurologic: Alert and oriented. Thought content appropriate.  GCS: Motor: 6/Verbal: 5/Eyes: 4 GCS Total: 15  Mental Status: Awake, Alert, Oriented x 4  Cranial nerves: face symmetric, tongue midline, CN II-XII grossly intact.   Eyes: pupils equal, round, reactive to light with accomodation, EOMI.   Pulmonary: normal respirations, no signs of respiratory distress  Abdomen: soft, non-distended, not tender to palpation  Skin: Skin is warm, dry and intact.  Sensory: Decreased sensation to L first three digits, o/w intact to light touch throughout     Motor Strength: Moves all extremities spontaneously with good tone. L bicep 4+/5, o/w full strength upper and lower extremities. No abnormal movements seen.      Strength   Deltoids Triceps Biceps Wrist Extension Wrist Flexion Hand    Upper: R 5/5 5/5 5/5 5/5 5/5 5/5     L 5/5 5/5 4+/5 5/5 5/5 5/5       Iliopsoas Quadriceps Knee  Flexion Tibialis  anterior Gastro- cnemius EHL   Lower: R 5/5 5/5 5/5 5/5 5/5 5/5     L 5/5 5/5 5/5 5/5 5/5 5/5      Interosseous muscles: L 4+/5, R 5/5. Exam pain limited.     Reflexes:   DTR: 2+ symmetrically throughout.  Cartagena's: Negative.    Significant Labs:  Recent Labs   Lab 09/20/19  0946 09/21/19  0330    91    139   K 4.6 3.9   CL  106 105   CO2 26 26   BUN 14 18   CREATININE 1.0 1.0   CALCIUM 9.4 8.9     Recent Labs   Lab 09/20/19  0946 09/21/19  0330   WBC 7.14 5.40   HGB 15.1 13.4*   HCT 45.5 40.3    195     Recent Labs   Lab 09/20/19  1723   INR 1.0   APTT 26.1       Significant Diagnostics:  Ct Cervical Spine Without Contrast    Result Date: 9/21/2019  Degenerative change at C5-6 including a moderate-sized left paracentral disc extrusion as delineated on recent MRI. No significant degenerative change elsewhere in the cervical spine. Electronically signed by: Patrice Daniels MD Date:    09/21/2019 Time:    12:27    X-ray Chest Ap Portable    Result Date: 9/20/2019  I detect no convincing evidence of disease on this single bedside radiograph. Electronically signed by: Jade Cox MD Date:    09/20/2019 Time:    17:22      Assessment/Plan:     Bulging of cervical intervertebral disc  32 y/o M with no significant PMH who present with 4 days of L-sided neck and arm pain, found to have C5-6 disc herniation and associated cord signal change.    -MRI C-spine reviewed: L paracentral disc herniation causing moderate spinal cord effacement with associated cord signal change. Straightening of cervical spine also noted.  -To OR for ACDR with Dr. Morris  -Neuro checks and vital signs q4h  -Preop labs, CXR, and EKG reviewed.  -NPO,   -Pain control: Norco PRN, morphine IV for pain not relieved by PO medication  -Muscle spasms: Flexeril PRN  -Tobacco Use: Nicotine patch ordered      Additional recs following surgery.          Vickie Ugarte MD  Neurosurgery  [facility]

## 2019-09-22 VITALS
BODY MASS INDEX: 18.02 KG/M2 | DIASTOLIC BLOOD PRESSURE: 70 MMHG | HEIGHT: 69 IN | WEIGHT: 121.69 LBS | RESPIRATION RATE: 18 BRPM | TEMPERATURE: 98 F | SYSTOLIC BLOOD PRESSURE: 103 MMHG | OXYGEN SATURATION: 98 % | HEART RATE: 91 BPM

## 2019-09-22 LAB
ANION GAP SERPL CALC-SCNC: 8 MMOL/L (ref 8–16)
BASOPHILS # BLD AUTO: 0.03 K/UL (ref 0–0.2)
BASOPHILS NFR BLD: 0.3 % (ref 0–1.9)
BUN SERPL-MCNC: 14 MG/DL (ref 6–20)
CALCIUM SERPL-MCNC: 9 MG/DL (ref 8.7–10.5)
CHLORIDE SERPL-SCNC: 105 MMOL/L (ref 95–110)
CO2 SERPL-SCNC: 23 MMOL/L (ref 23–29)
CREAT SERPL-MCNC: 0.9 MG/DL (ref 0.5–1.4)
DIFFERENTIAL METHOD: ABNORMAL
EOSINOPHIL # BLD AUTO: 0 K/UL (ref 0–0.5)
EOSINOPHIL NFR BLD: 0 % (ref 0–8)
ERYTHROCYTE [DISTWIDTH] IN BLOOD BY AUTOMATED COUNT: 13.4 % (ref 11.5–14.5)
EST. GFR  (AFRICAN AMERICAN): >60 ML/MIN/1.73 M^2
EST. GFR  (NON AFRICAN AMERICAN): >60 ML/MIN/1.73 M^2
GLUCOSE SERPL-MCNC: 125 MG/DL (ref 70–110)
HCT VFR BLD AUTO: 40.3 % (ref 40–54)
HGB BLD-MCNC: 13.4 G/DL (ref 14–18)
IMM GRANULOCYTES # BLD AUTO: 0.03 K/UL (ref 0–0.04)
IMM GRANULOCYTES NFR BLD AUTO: 0.3 % (ref 0–0.5)
LYMPHOCYTES # BLD AUTO: 0.8 K/UL (ref 1–4.8)
LYMPHOCYTES NFR BLD: 9.3 % (ref 18–48)
MCH RBC QN AUTO: 29 PG (ref 27–31)
MCHC RBC AUTO-ENTMCNC: 33.3 G/DL (ref 32–36)
MCV RBC AUTO: 87 FL (ref 82–98)
MONOCYTES # BLD AUTO: 0.5 K/UL (ref 0.3–1)
MONOCYTES NFR BLD: 5.3 % (ref 4–15)
NEUTROPHILS # BLD AUTO: 7.6 K/UL (ref 1.8–7.7)
NEUTROPHILS NFR BLD: 84.8 % (ref 38–73)
NRBC BLD-RTO: 0 /100 WBC
PLATELET # BLD AUTO: 172 K/UL (ref 150–350)
PMV BLD AUTO: 11.1 FL (ref 9.2–12.9)
POTASSIUM SERPL-SCNC: 4.2 MMOL/L (ref 3.5–5.1)
RBC # BLD AUTO: 4.62 M/UL (ref 4.6–6.2)
SODIUM SERPL-SCNC: 136 MMOL/L (ref 136–145)
WBC # BLD AUTO: 8.95 K/UL (ref 3.9–12.7)

## 2019-09-22 PROCEDURE — 25000003 PHARM REV CODE 250: Performed by: STUDENT IN AN ORGANIZED HEALTH CARE EDUCATION/TRAINING PROGRAM

## 2019-09-22 PROCEDURE — 36415 COLL VENOUS BLD VENIPUNCTURE: CPT

## 2019-09-22 PROCEDURE — 85025 COMPLETE CBC W/AUTO DIFF WBC: CPT

## 2019-09-22 PROCEDURE — 25000003 PHARM REV CODE 250: Performed by: NEUROLOGICAL SURGERY

## 2019-09-22 PROCEDURE — 80048 BASIC METABOLIC PNL TOTAL CA: CPT

## 2019-09-22 PROCEDURE — 99232 SBSQ HOSP IP/OBS MODERATE 35: CPT | Mod: ,,, | Performed by: NEUROLOGICAL SURGERY

## 2019-09-22 PROCEDURE — 99232 PR SUBSEQUENT HOSPITAL CARE,LEVL II: ICD-10-PCS | Mod: ,,, | Performed by: NEUROLOGICAL SURGERY

## 2019-09-22 PROCEDURE — S4991 NICOTINE PATCH NONLEGEND: HCPCS | Performed by: STUDENT IN AN ORGANIZED HEALTH CARE EDUCATION/TRAINING PROGRAM

## 2019-09-22 PROCEDURE — 63600175 PHARM REV CODE 636 W HCPCS: Performed by: STUDENT IN AN ORGANIZED HEALTH CARE EDUCATION/TRAINING PROGRAM

## 2019-09-22 RX ORDER — HYDROCODONE BITARTRATE AND ACETAMINOPHEN 10; 325 MG/1; MG/1
1 TABLET ORAL EVERY 6 HOURS PRN
Qty: 30 TABLET | Refills: 0 | Status: SHIPPED | OUTPATIENT
Start: 2019-09-22 | End: 2019-09-22 | Stop reason: SDUPTHER

## 2019-09-22 RX ORDER — OXYCODONE HYDROCHLORIDE 10 MG/1
10 TABLET ORAL EVERY 4 HOURS PRN
Qty: 30 TABLET | Refills: 0 | Status: SHIPPED | OUTPATIENT
Start: 2019-09-22 | End: 2019-09-22 | Stop reason: HOSPADM

## 2019-09-22 RX ORDER — NAPROXEN 500 MG/1
500 TABLET ORAL 2 TIMES DAILY
Qty: 28 TABLET | Refills: 0 | Status: SHIPPED | OUTPATIENT
Start: 2019-09-22 | End: 2019-09-22 | Stop reason: HOSPADM

## 2019-09-22 RX ORDER — DIAZEPAM 5 MG/1
5 TABLET ORAL EVERY 6 HOURS PRN
Qty: 30 TABLET | Refills: 0 | Status: SHIPPED | OUTPATIENT
Start: 2019-09-22 | End: 2019-09-22 | Stop reason: HOSPADM

## 2019-09-22 RX ORDER — TIZANIDINE 4 MG/1
4 TABLET ORAL 3 TIMES DAILY
Qty: 42 TABLET | Refills: 0 | Status: SHIPPED | OUTPATIENT
Start: 2019-09-22 | End: 2019-09-22 | Stop reason: SDUPTHER

## 2019-09-22 RX ORDER — HYDROCODONE BITARTRATE AND ACETAMINOPHEN 10; 325 MG/1; MG/1
1 TABLET ORAL EVERY 6 HOURS PRN
Qty: 30 TABLET | Refills: 0 | Status: SHIPPED | OUTPATIENT
Start: 2019-09-22 | End: 2019-10-02 | Stop reason: SDUPTHER

## 2019-09-22 RX ORDER — OXYCODONE HYDROCHLORIDE 10 MG/1
10 TABLET ORAL EVERY 4 HOURS PRN
Qty: 30 TABLET | Refills: 0 | Status: SHIPPED | OUTPATIENT
Start: 2019-09-22 | End: 2019-09-22

## 2019-09-22 RX ORDER — TIZANIDINE 4 MG/1
4 TABLET ORAL 3 TIMES DAILY
Qty: 42 TABLET | Refills: 0 | Status: SHIPPED | OUTPATIENT
Start: 2019-09-22 | End: 2019-10-07 | Stop reason: SDUPTHER

## 2019-09-22 RX ADMIN — FAMOTIDINE 20 MG: 20 TABLET ORAL at 08:09

## 2019-09-22 RX ADMIN — OXYCODONE HYDROCHLORIDE 10 MG: 10 TABLET ORAL at 02:09

## 2019-09-22 RX ADMIN — ACETAMINOPHEN 650 MG: 325 TABLET ORAL at 12:09

## 2019-09-22 RX ADMIN — OXYCODONE HYDROCHLORIDE 10 MG: 10 TABLET ORAL at 08:09

## 2019-09-22 RX ADMIN — NICOTINE 7 MG/24 HR DAILY TRANSDERMAL PATCH 1 PATCH: at 08:09

## 2019-09-22 RX ADMIN — OXYCODONE HYDROCHLORIDE 10 MG: 10 TABLET ORAL at 12:09

## 2019-09-22 RX ADMIN — SODIUM CHLORIDE: 0.9 INJECTION, SOLUTION INTRAVENOUS at 12:09

## 2019-09-22 NOTE — PLAN OF CARE
Problem: Fall Injury Risk  Goal: Absence of Fall and Fall-Related Injury  Outcome: Ongoing, Progressing     Problem: Adult Inpatient Plan of Care  Goal: Plan of Care Review  Outcome: Ongoing, Progressing  Goal: Absence of Hospital-Acquired Illness or Injury  Outcome: Ongoing, Progressing  Goal: Optimal Comfort and Wellbeing  Outcome: Ongoing, Progressing  Goal: Readiness for Transition of Care  Outcome: Ongoing, Progressing     Problem: Pain Acute  Goal: Optimal Pain Control  Outcome: Ongoing, Progressing   POC reviewed with patient. All questions and concerns reviewed. Vital signs stable throughout shift. For full assessment please refer to flowsheet. Fall/ safety precautions implemented & maintained. Bed locked in lowest position, bed alarm activated & audible, & call light in reach. Will continue to monitor.

## 2019-09-22 NOTE — PROGRESS NOTES
[facility]  Neurosurgery  Progress Note    Subjective:     History of Present Illness: Patient is a 34 y/o M with no significant PMHx who presented to the ED with left sided neck and arm pain that began 4 days PTA. He awoke with a stabbing pain in the left side of the neck after some heavy lifting over the weekend, denies any trauma. Pain began to radiate down the left lateral upper arm, medial forearm, and into the first three digits. He has associated tingling of his first three fingers. He denies weakness, bowel/bladder dysfunction, saddle anesthesia, and shuffling gait. He feels that he is off balance due to compensating for left-sided pain. He went to the chiropractor, no manipulation was done. Also went to urgent care Wednesday, received Toradol injection and prescribed Robaxin with little relief. MRI was obtained per ED, which showed C5/6 disc bulge and cord signal change. Neurosurgery was consulted. Patient denies any other medical issues, no previous surgeries, and takes no medications at home except occasional low dose suboxone.     Post-Op Info:  Procedure(s):  DISCECTOMY, SPINE, CERVICAL, ANTERIOR APPROACH, WITH FUSION, C5-C6   1 Day Post-Op     Interval History: s/p C5-6 ACDF    Medications:  Continuous Infusions:   sodium chloride 0.9% 75 mL/hr at 09/22/19 0013     Scheduled Meds:   famotidine  20 mg Oral BID    nicotine  1 patch Transdermal Daily     PRN Meds:acetaminophen, Dextrose 10% Bolus, Dextrose 10% Bolus, diazePAM, glucagon (human recombinant), glucose, glucose, glucose, HYDROmorphone, ondansetron, oxyCODONE, senna-docusate 8.6-50 mg     Review of Systems  Objective:     Weight: 55.2 kg (121 lb 11.1 oz)  Body mass index is 17.97 kg/m².  Vital Signs (Most Recent):  Temp: 96.1 °F (35.6 °C) (09/22/19 0753)  Pulse: 77 (09/22/19 0753)  Resp: 18 (09/22/19 0753)  BP: 119/73 (09/22/19 0753)  SpO2: 97 % (09/22/19 0753) Vital Signs (24h Range):  Temp:  [96.1 °F (35.6 °C)-99 °F  (37.2 °C)] 96.1 °F (35.6 °C)  Pulse:  [63-93] 77  Resp:  [14-22] 18  SpO2:  [93 %-100 %] 97 %  BP: ()/(52-77) 119/73                          Neurosurgery Physical Exam  GCS15 Aox3  CN2-12 intact  RUE/LUE: 5/5 Delt/Bi/Tri/IO/  RLE:/LLE: 5/5 HF/QD/DF/EHL/PF  SILT  Incision CDI with dermabond glue in place    Significant Labs:  Recent Labs   Lab 09/21/19  0330 09/22/19  0347   GLU 91 125*    136   K 3.9 4.2    105   CO2 26 23   BUN 18 14   CREATININE 1.0 0.9   CALCIUM 8.9 9.0     Recent Labs   Lab 09/21/19  0330 09/22/19  0347   WBC 5.40 8.95   HGB 13.4* 13.4*   HCT 40.3 40.3    172     Recent Labs   Lab 09/20/19  1723   INR 1.0   APTT 26.1     Microbiology Results (last 7 days)     ** No results found for the last 168 hours. **        All pertinent labs from the last 24 hours have been reviewed.    Significant Diagnostics:  I have reviewed all pertinent imaging results/findings within the past 24 hours.    Assessment/Plan:     Cervical herniated disc  34 y/o M with no significant PMH who present with 4 days of L-sided neck and arm pain, found to have C5-6 disc herniation and associated cord signal change. S/p C5-6 ACDF on 9/21.    -MRI C-spine reviewed: L paracentral disc herniation causing moderate spinal cord effacement with associated cord signal change. Straightening of cervical spine also noted.  -s/p C5-6 ACDF  -Neurologically improved and stable  -Ready for discharge today  -Will arrange follow up in 2 weeks          Damien Constantino MD  Neurosurgery  [facility]

## 2019-09-22 NOTE — HOSPITAL COURSE
9/20: Patient is a 34 y/o M with no significant PMHx who presented to the ED with left sided neck and arm pain that began 4 days PTA. He awoke with a stabbing pain in the left side of the neck after some heavy lifting over the weekend, denies any trauma. Pain began to radiate down the left lateral upper arm, medial forearm, and into the first three digits. He has associated tingling of his first three fingers. He denies weakness, bowel/bladder dysfunction, saddle anesthesia, and shuffling gait. He feels that he is off balance due to compensating for left-sided pain. He went to the chiropractor, no manipulation was done. Also went to urgent care Wednesday, received Toradol injection and prescribed Robaxin with little relief. MRI was obtained per ED, which showed C5/6 disc bulge and cord signal change. Neurosurgery was consulted. Patient denies any other medical issues, no previous surgeries, and takes no medications at home except occasional low dose suboxone.   9/21 Patient c/o significant pain this AM, worsening L hand numbness. To OR today for C5-6 ACDF.  9/22 Recovered well from surgery, discharged home in good condition with 2 week  follow up arranged.

## 2019-09-22 NOTE — SUBJECTIVE & OBJECTIVE
Interval History: s/p C5-6 ACDF    Medications:  Continuous Infusions:   sodium chloride 0.9% 75 mL/hr at 09/22/19 0013     Scheduled Meds:   famotidine  20 mg Oral BID    nicotine  1 patch Transdermal Daily     PRN Meds:acetaminophen, Dextrose 10% Bolus, Dextrose 10% Bolus, diazePAM, glucagon (human recombinant), glucose, glucose, glucose, HYDROmorphone, ondansetron, oxyCODONE, senna-docusate 8.6-50 mg     Review of Systems  Objective:     Weight: 55.2 kg (121 lb 11.1 oz)  Body mass index is 17.97 kg/m².  Vital Signs (Most Recent):  Temp: 96.1 °F (35.6 °C) (09/22/19 0753)  Pulse: 77 (09/22/19 0753)  Resp: 18 (09/22/19 0753)  BP: 119/73 (09/22/19 0753)  SpO2: 97 % (09/22/19 0753) Vital Signs (24h Range):  Temp:  [96.1 °F (35.6 °C)-99 °F (37.2 °C)] 96.1 °F (35.6 °C)  Pulse:  [63-93] 77  Resp:  [14-22] 18  SpO2:  [93 %-100 %] 97 %  BP: ()/(52-77) 119/73                          Neurosurgery Physical Exam  GCS15 Aox3  CN2-12 intact  RUE/LUE: 5/5 Delt/Bi/Tri/IO/  RLE:/LLE: 5/5 HF/QD/DF/EHL/PF  SILT  Incision CDI with dermabond glue in place    Significant Labs:  Recent Labs   Lab 09/21/19 0330 09/22/19 0347   GLU 91 125*    136   K 3.9 4.2    105   CO2 26 23   BUN 18 14   CREATININE 1.0 0.9   CALCIUM 8.9 9.0     Recent Labs   Lab 09/21/19 0330 09/22/19 0347   WBC 5.40 8.95   HGB 13.4* 13.4*   HCT 40.3 40.3    172     Recent Labs   Lab 09/20/19  1723   INR 1.0   APTT 26.1     Microbiology Results (last 7 days)     ** No results found for the last 168 hours. **        All pertinent labs from the last 24 hours have been reviewed.    Significant Diagnostics:  I have reviewed all pertinent imaging results/findings within the past 24 hours.

## 2019-09-22 NOTE — NURSING
IV x2 removed and pressure dressing placed without bleeding. Tele returned to bin, discharge paperwork and hardcopy rx given to pt. Education provided on discharge poc, pt awaiting w/c transport to discharge with family. No distress noted at this time.

## 2019-09-22 NOTE — ASSESSMENT & PLAN NOTE
32 y/o M with no significant PMH who present with 4 days of L-sided neck and arm pain, found to have C5-6 disc herniation and associated cord signal change. S/p C5-6 ACDF on 9/21.    -MRI C-spine reviewed: L paracentral disc herniation causing moderate spinal cord effacement with associated cord signal change. Straightening of cervical spine also noted.  -s/p C5-6 ACDF  -Neurologically improved and stable  -Ready for discharge today  -Will arrange follow up in 2 weeks

## 2019-09-22 NOTE — DISCHARGE INSTRUCTIONS
Neurosurgery Patient Information. Please follow ONLY the instructions that are checked below.    Activity Restrictions:  [x]  Return to work will be determined on an individual basis.  [x]  No lifting greater than 5-10 pounds.  [x]  Avoid bending and twisting the area of your surgery more than 45 degrees from neutral position in any direction.  [x]  No driving or operating machinery:  [x]  until cleared by your surgeon.  [x]  while taking narcotic pain medications or muscle relaxants.  [x]  Wear your brace at all times except when lying in bed, showering, using the restroom, or performing hygiene tasks.   [x]  Increase ambulation over the next 2 weeks so that you are walking 2 miles per day at 2 weeks post-operatively.  [x]  Walk on paved surfaces only. It is okay to walk up and down stairs while holding onto a side rail.  [x]  No sexual activity for 6 weeks.    Discharge Medication/Follow-up:  [x]  Please refer to discharge medication reconciliation form.  [x]  Do not take ANY Aspirin or Aspirin containing products for 2 weeks after surgery.   [x]  Do not take ANY herbal supplements for 2 weeks after surgery.    [x]  Do not take ANY non-steroidal anti-inflammatory drugs (NSAIDS), including the following: ibuprofen, naprosyn, Aleve, Advil, Indocin, Mobic, or Celebrex for 6 weeks after surgery.   [x]  Prescriptions for appropriate medication will be given upon discharge.  [x]  Take docusate (Colace 100 mg): take one capsule a day as needed for constipation. You can get this over the counter.  [x]  Follow-up appointment:  [x]  10-14 days post-op for wound check by physician assistant/nurse  [x]  4-6 weeks with MD:  [x]  with x-rays  [x]  An appointment will be mailed to you.    Wound Care:  [x]  No bandage required. Keep your incision open to the air.  [x]  You may shower on the 2nd day after your surgery. Keep the incision clean and dry at all times. Please cover the incision while showering and REMOVE once you have  completed taking your shower. Do not allow the force of water to hit the incision. If the incision gets damp, pat it dry. Do not rub or scrub the incision.  [x]  You cannot take a bath until 8 weeks after surgery.  [x]  Apply bacitracin to incision twice a day for 2 weeks.    Call your doctor or go to the Emergency Room for any signs of infection, including: increased redness, drainage, pain, or fever (temperature ?101.5 for 24 hours). Call your doctor or go to the Emergency Room if there are any localized neurological changes; problems with speech, vision, numbness, tingling, weakness, or severe headache; or for other concerns.    Special Instructions:  [x]  No use of tobacco products.  [x]  Diet: Please eat a regular diet as tolerated.      Physicians need 3 days' notice for pain medicine to be refilled. Pain medicine will only be refilled between 8 AM and 5 PM, Monday through Friday, due to Food and Drug Administration regulation of documentation.    If you have any questions about this form, please call [phone].    Form No. 98998 (Revised 10/31/2013)

## 2019-09-23 ENCOUNTER — TELEPHONE (OUTPATIENT)
Dept: NEUROSURGERY | Facility: CLINIC | Age: 34
End: 2019-09-23

## 2019-09-23 NOTE — ANESTHESIA POSTPROCEDURE EVALUATION
Anesthesia Post Evaluation    Patient: Eleazar Casillas    Procedure(s) Performed: Procedure(s):  DISCECTOMY, SPINE, CERVICAL, ANTERIOR APPROACH, WITH FUSION, C5-C6    Final Anesthesia Type: general  Patient location during evaluation: OPS  Patient participation: Yes- Able to Participate (by phone)  Level of consciousness: awake and alert  Post-procedure vital signs: reviewed and stable  Pain management: adequate  Airway patency: patent  PONV status at discharge: No PONV  Anesthetic complications: no      Cardiovascular status: stable  Respiratory status: unassisted  Hydration status: euvolemic  Follow-up not needed.          Vitals Value Taken Time   /70 9/22/2019 11:07 AM   Temp 36.7 °C (98.1 °F) 9/22/2019 11:07 AM   Pulse 91 9/22/2019 11:13 AM   Resp 18 9/22/2019 11:07 AM   SpO2 98 % 9/22/2019 11:07 AM         Event Time     Out of Recovery 09/21/2019 18:05:00          Pain/Lamine Score: Pain Rating Prior to Med Admin: 7 (9/22/2019 12:24 PM)  Pain Rating Post Med Admin: 2 (9/22/2019  9:20 AM)        
same name as above

## 2019-09-23 NOTE — TELEPHONE ENCOUNTER
----- Message from Loly Kohli sent at 9/23/2019 10:52 AM CDT -----  Contact: Self   Type: Patient Call Back    Who called: Self     What is the request in detail:patient need to schedule his post-op. Please call     Can the clinic reply by [facility]? No   Would the patient rather a call back or a response via [facility]?  Call     Best call back number:[phone]

## 2019-09-25 ENCOUNTER — TELEPHONE (OUTPATIENT)
Dept: NEUROSURGERY | Facility: CLINIC | Age: 34
End: 2019-09-25

## 2019-09-25 NOTE — TELEPHONE ENCOUNTER
----- Message from Ingris Hawkins LPN sent at 9/25/2019 10:34 AM CDT -----  Georgette Aguilar Staff  Caller: Cassidy Morales (Today,  9:43 AM)         Type: Patient Call Back     Who called: Cassidy Morales     What is the request in detail: Cassidy Morales is requesting a call back to discuss the details of the pt surgery.     Can the clinic reply by [facility]? No     Would the patient rather a call back or a response via [facility]? Call back     Best call back number: [phone]

## 2019-09-25 NOTE — TELEPHONE ENCOUNTER
Spoke to patients aunt who wanted to know if patient was scheduled for 1 week or 2 week post op appt. Nurse informed aunt patient scheduled for 2 week post op 10/07 and it has been mailed, aunt verbalized understanding.

## 2019-09-27 ENCOUNTER — TELEPHONE (OUTPATIENT)
Dept: NEUROSURGERY | Facility: CLINIC | Age: 34
End: 2019-09-27

## 2019-09-27 RX ORDER — METHYLPREDNISOLONE 4 MG/1
TABLET ORAL
Qty: 1 PACKAGE | Refills: 0 | Status: SHIPPED | OUTPATIENT
Start: 2019-09-27 | End: 2019-10-07 | Stop reason: ALTCHOICE

## 2019-09-27 NOTE — TELEPHONE ENCOUNTER
----- Message from Ingris Hawkins LPN sent at 9/27/2019 11:25 AM CDT -----  Contact: Patient   Called patient he states he had sore throat initially which is now resolved but now he has lump in throat and numbness to right side of chin that went away briefly yesterday then returned, also hard to swallow. Please advise.    Ingris  ----- Message -----  From: Georgette Lagos  Sent: 9/27/2019  11:00 AM CDT  To: Arturo Aguilar Staff    Type:  Needs Medical Advice/Symptom-based Call    Who Called: Patient     Symptoms (please be specific): knot in throat, trouble swallowing and chin numbness    How long has patient had these symptoms:  Yesterday     Would the patient rather a call back or a response via [facility]? Call back     Best Call Back Number: [phone]    Additional Information: Pt states that he had surgery on 9/21

## 2019-09-27 NOTE — TELEPHONE ENCOUNTER
Spoke with patient. No choking, but reports pain with swallowing liquids, solids, and with breathing. Pain is described as muscular and radiating to his collar bones. He feels like he has a knot in his throat, but denies any actual, visible knots.     Denies fever, drainage, new weakness, new numbness. Reports improvement in LUE pain.     Numbness under chin is common after ACDF due to incision location.     I spoke with Dr. Morris regarding possible steroid prescription for swelling. He will call patient to discuss further.       Agnes Jimenez PA-C  [facility]  Department of Neurosurgery  [phone]

## 2019-10-02 DIAGNOSIS — Z98.890 S/P DISCECTOMY: ICD-10-CM

## 2019-10-02 DIAGNOSIS — Z98.890 S/P DISCECTOMY: Primary | ICD-10-CM

## 2019-10-02 RX ORDER — HYDROCODONE BITARTRATE AND ACETAMINOPHEN 10; 325 MG/1; MG/1
1 TABLET ORAL EVERY 6 HOURS PRN
Qty: 40 TABLET | Refills: 0 | Status: SHIPPED | OUTPATIENT
Start: 2019-10-02 | End: 2019-10-05 | Stop reason: SDUPTHER

## 2019-10-02 RX ORDER — HYDROCODONE BITARTRATE AND ACETAMINOPHEN 10; 325 MG/1; MG/1
1 TABLET ORAL EVERY 6 HOURS PRN
Qty: 30 TABLET | Refills: 0 | Status: SHIPPED | OUTPATIENT
Start: 2019-10-02 | End: 2019-10-02 | Stop reason: SDUPTHER

## 2019-10-05 DIAGNOSIS — Z98.890 S/P DISCECTOMY: ICD-10-CM

## 2019-10-05 RX ORDER — HYDROCODONE BITARTRATE AND ACETAMINOPHEN 10; 325 MG/1; MG/1
1 TABLET ORAL EVERY 6 HOURS PRN
Qty: 40 TABLET | Refills: 0 | Status: SHIPPED | OUTPATIENT
Start: 2019-10-05 | End: 2019-10-14 | Stop reason: ALTCHOICE

## 2019-10-07 ENCOUNTER — OFFICE VISIT (OUTPATIENT)
Dept: NEUROSURGERY | Facility: CLINIC | Age: 34
End: 2019-10-07
Payer: COMMERCIAL

## 2019-10-07 VITALS
TEMPERATURE: 98 F | WEIGHT: 122.38 LBS | BODY MASS INDEX: 18.13 KG/M2 | SYSTOLIC BLOOD PRESSURE: 102 MMHG | HEART RATE: 95 BPM | DIASTOLIC BLOOD PRESSURE: 69 MMHG | RESPIRATION RATE: 18 BRPM | HEIGHT: 69 IN

## 2019-10-07 DIAGNOSIS — Z98.890 S/P DISCECTOMY: Primary | ICD-10-CM

## 2019-10-07 PROCEDURE — 99024 PR POST-OP FOLLOW-UP VISIT: ICD-10-PCS | Mod: S$GLB,,, | Performed by: PHYSICIAN ASSISTANT

## 2019-10-07 PROCEDURE — 99999 PR PBB SHADOW E&M-EST. PATIENT-LVL III: ICD-10-PCS | Mod: PBBFAC,,, | Performed by: PHYSICIAN ASSISTANT

## 2019-10-07 PROCEDURE — 99999 PR PBB SHADOW E&M-EST. PATIENT-LVL III: CPT | Mod: PBBFAC,,, | Performed by: PHYSICIAN ASSISTANT

## 2019-10-07 PROCEDURE — 99024 POSTOP FOLLOW-UP VISIT: CPT | Mod: S$GLB,,, | Performed by: PHYSICIAN ASSISTANT

## 2019-10-07 RX ORDER — TIZANIDINE 4 MG/1
4 TABLET ORAL 3 TIMES DAILY PRN
Qty: 60 TABLET | Refills: 0 | Status: SHIPPED | OUTPATIENT
Start: 2019-10-07 | End: 2020-01-01 | Stop reason: SDUPTHER

## 2019-10-07 NOTE — PROGRESS NOTES
Wound Check   Neurosurgery     Eleazar Casillas is a 33 y.o. male who presents to clinic today for 2 week wound check, s/p C5-6 ACDF with Dr. Morris on 09/21/2019.  Denies fevers, chills, night sweats or N/V. Further denies wound drainage or swelling. Pt has been taking Norco 2-3 x per day with good relief. Sensation has improved and the patient now only has numbness in his left thumb.  Radicular pain has resolved.  Still taking Norco primarily for neck pain in the mornings and evenings.      Physical Exam:   General: well developed, well nourished, no distress  Neurologic: Alert and oriented. Thought content appropriate.   GCS: Motor: 6/Verbal: 5/Eyes: 4 GCS Total: 15   Mental Status: Awake, Alert, Oriented x3   Cranial nerves: face symmetric, tongue midline, pupils equal, round, reactive to light with accomodation, EOMI.   Motor Strength: moves all extremities with good strength and tone; 4+/5 left bicep   Sensation: response to light touch throughout; decreased in left thumb only   No gait disturbances     Incision is clean, dry and intact with no signs of erythema, swelling or purulent drainage. Dissolvable sutures are intact. All skin edges are completely approximated with dermabond      Vitals:    10/07/19 1130   BP: 102/69   Pulse: 95   Resp: 18   Temp: 98.3 °F (36.8 °C)             Assessment/Plan:   Eleazar Casillas is a 33 y.o. male who presents for 2 week wound check, s/p C5-6 ACDF on 09/21/2019 with Dr. Morris for acute left C6 radiculopathy with associated biceps weakness.  On exam, patient has residual 4+ out of 5 left biceps weakness.  Sensation has improved and the patient now only has numbness in his left thumb.  Radicular pain has resolved.    -Keep incision open to air   -Can shower and get incision wet, just pat dry and no vigorous scrubbing. Do not submerge incision for another 4 weeks.   -No lifting more than 10 lbs or excessive bending/twisting.   -Can drive after 4 weeks when no longer taking  narcotics and cleared by neurosurgery   -Follow up with Dr. Morris in 4 weeks with xrays before     Call when [place] refill is needed.   Refill for tizanidine sent to your pharmacy     -Encouraged patient to call if they have any questions or concerns prior to next follow up appt        Agnes Jimenez PA-C  [facility]  Department of Neurosurgery  [phone]

## 2019-10-07 NOTE — PATIENT INSTRUCTIONS
-Keep incision open to air   -Can shower and get incision wet, just pat dry and no vigorous scrubbing. Do not submerge incision for another 4 weeks.   -No lifting more than 10 lbs or excessive bending/twisting.   -Can drive after 4 weeks when no longer taking narcotics and cleared by neurosurgery   -Follow up with Dr. Morris in 4 weeks with xrays before     Call when [place] refill is needed.   Refill for tizanidine sent to your pharmacy     Please call with any questions or concerns prior to your next appointment.

## 2019-10-14 ENCOUNTER — TELEPHONE (OUTPATIENT)
Dept: NEUROSURGERY | Facility: HOSPITAL | Age: 34
End: 2019-10-14

## 2019-10-14 RX ORDER — HYDROCODONE BITARTRATE AND ACETAMINOPHEN 5; 325 MG/1; MG/1
1 TABLET ORAL EVERY 8 HOURS PRN
Qty: 21 TABLET | Refills: 0 | Status: SHIPPED | OUTPATIENT
Start: 2019-10-14 | End: 2019-10-25 | Stop reason: SDUPTHER

## 2019-10-14 NOTE — TELEPHONE ENCOUNTER
----- Message from Ingris Hawkins LPN sent at 10/11/2019  2:29 PM CDT -----  Contact: Self/  [phone]      ----- Message -----  From: Omayra Jonas  Sent: 10/11/2019   2:21 PM CDT  To: Arturo Aguilar Staff    Type: RX Refill Request    Who Called:   Patient    Have you contacted your pharmacy:  No    Refill or New Rx:  Refill    RX Name and Strength:  HYDROcodone-acetaminophen (NORCO)  mg per tablet                                             Preferred Pharmacy with phone number[facility]  [id number] [place] [facility] [address]  AT [facility] [address] [facility] [address]    Local or Mail Order:  Local    Ordering Provider:  MERRITT Morris    Would the patient rather a call back or a response via [facility]?   Call back    Best Call Back Number:  [phone]

## 2019-10-25 ENCOUNTER — TELEPHONE (OUTPATIENT)
Dept: NEUROSURGERY | Facility: HOSPITAL | Age: 34
End: 2019-10-25

## 2019-10-25 RX ORDER — HYDROCODONE BITARTRATE AND ACETAMINOPHEN 5; 325 MG/1; MG/1
1 TABLET ORAL EVERY 8 HOURS PRN
Qty: 30 TABLET | Refills: 0 | Status: SHIPPED | OUTPATIENT
Start: 2019-10-25 | End: 2021-05-06

## 2019-10-28 ENCOUNTER — TELEPHONE (OUTPATIENT)
Dept: NEUROSURGERY | Facility: HOSPITAL | Age: 34
End: 2019-10-28

## 2019-10-28 NOTE — TELEPHONE ENCOUNTER
----- Message from Susan Cordon MA sent at 10/28/2019 12:49 PM CDT -----  Contact: Eelazar      ----- Message -----  From: Gillian Holman  Sent: 10/28/2019  12:45 PM CDT  To: Arturo Aguilar Staff    Pt calling because he is having issues getting his Norco.  He states that the pharmacy will not fill the prescription if it is more than 7 days dispensed. He is requesting that we resubmit the prescription for 7 days (28 pills) and his insurance will cover it.  Please contact him at [phone] to discuss further.

## 2019-10-28 NOTE — TELEPHONE ENCOUNTER
Called [facility]. As patient stated, only a 7 day supply will be approved by insurance. Quantity decreased to 21. Pharmacy will contact patient to  prescription today.

## 2019-11-11 ENCOUNTER — OFFICE VISIT (OUTPATIENT)
Dept: NEUROSURGERY | Facility: CLINIC | Age: 34
End: 2019-11-11
Payer: COMMERCIAL

## 2019-11-11 ENCOUNTER — HOSPITAL ENCOUNTER (OUTPATIENT)
Dept: RADIOLOGY | Facility: HOSPITAL | Age: 34
Discharge: HOME | End: 2019-11-11
Attending: STUDENT IN AN ORGANIZED HEALTH CARE EDUCATION/TRAINING PROGRAM
Payer: COMMERCIAL

## 2019-11-11 VITALS
HEIGHT: 69 IN | WEIGHT: 126.13 LBS | TEMPERATURE: 98 F | BODY MASS INDEX: 18.68 KG/M2 | DIASTOLIC BLOOD PRESSURE: 67 MMHG | SYSTOLIC BLOOD PRESSURE: 111 MMHG | HEART RATE: 73 BPM

## 2019-11-11 DIAGNOSIS — Z98.1 S/P CERVICAL SPINAL FUSION: Primary | ICD-10-CM

## 2019-11-11 DIAGNOSIS — R20.0 NUMBNESS AND TINGLING IN LEFT HAND: ICD-10-CM

## 2019-11-11 DIAGNOSIS — R20.2 NUMBNESS AND TINGLING IN LEFT HAND: ICD-10-CM

## 2019-11-11 DIAGNOSIS — M54.12 LEFT CERVICAL RADICULOPATHY: ICD-10-CM

## 2019-11-11 DIAGNOSIS — M50.30 BULGING OF CERVICAL INTERVERTEBRAL DISC: ICD-10-CM

## 2019-11-11 PROCEDURE — 72040 X-RAY EXAM NECK SPINE 2-3 VW: CPT | Mod: TC,FY

## 2019-11-11 PROCEDURE — 72040 XR CERVICAL SPINE AP LATERAL: ICD-10-PCS | Mod: 26,,, | Performed by: RADIOLOGY

## 2019-11-11 PROCEDURE — 72040 X-RAY EXAM NECK SPINE 2-3 VW: CPT | Mod: 26,,, | Performed by: RADIOLOGY

## 2019-11-11 PROCEDURE — 99024 PR POST-OP FOLLOW-UP VISIT: ICD-10-PCS | Mod: S$GLB,,, | Performed by: NEUROLOGICAL SURGERY

## 2019-11-11 PROCEDURE — 99999 PR PBB SHADOW E&M-EST. PATIENT-LVL III: ICD-10-PCS | Mod: PBBFAC,,, | Performed by: NEUROLOGICAL SURGERY

## 2019-11-11 PROCEDURE — 99024 POSTOP FOLLOW-UP VISIT: CPT | Mod: S$GLB,,, | Performed by: NEUROLOGICAL SURGERY

## 2019-11-11 PROCEDURE — 99999 PR PBB SHADOW E&M-EST. PATIENT-LVL III: CPT | Mod: PBBFAC,,, | Performed by: NEUROLOGICAL SURGERY

## 2019-11-11 NOTE — PROGRESS NOTES
CHIEF COMPLAINT:  4-6 week follow-up    HPI:    Eleazar Casillas is a 34 y.o.-year-old male who presents today for post-operative follow-up s/p C5-6 ACDF on 9/21/19 for acute HNP.  Patient reports complete resolution of neck and arm pain.  He also reports that the numbness in his left thumb has completely resolved.  His wound is well healed.  He does note some stiffness and some cracking with head turning and head motions.  He has returned to work but is doing light duty until he gets cleared.      He additionally has intermittent sudden onset low back pain for which he sees a chiropractor.  Pain is typically in low back and does not involve any leg pain.  Currently this is not a major issue.    ROS:  As stated in the above HPI    PE:  Vitals:    11/11/19 1316   BP: 111/67   Pulse: 73   Temp: 98 °F (36.7 °C)       AAOX3  NAD  CN 2-12 intact     Strength:      Deltoids Biceps Triceps Wrist Ext. Wrist Flex. Hand    RUE 5 5 5 5 5 5   LUE 5 5 5 5 5 5    Hip Flex. Knee Flex. Knee Ext. Dorsi Flex Plantar Flex EHL   RLE 5 5 5 5 5 5   LLE 5 5 5 5 5 5     Sensation:  Intact to light touch (All 4 extremities)  Intact to pin prick (All 4 extremities)    Gait:  normal    DTR:  2+ and symmetric Biceps and knees    No hyperreflexia    Cervical incision:  Well healed without any signs of infection    IMAGING:  All imaging reviewed by me.    [facility]  Good position of C5-6 ACDF hardware and maintenance of cervical alignment    ASSESSMENT:   S/p C5-6 ACDF.   Neck and arm pain have completely resolved as well as left thumb numbness.  His wound has completely healed and he is doing well.    PLAN:   - Ok to advance activities as tolerated (lift > 10lbs, drive)  - Rec light duty work until evidence of caitlyn fusion given strenuous job duties including heavy lifting   - RTC in 6 weeks with CT

## 2019-11-15 ENCOUNTER — TELEPHONE (OUTPATIENT)
Dept: NEUROSURGERY | Facility: CLINIC | Age: 34
End: 2019-11-15

## 2019-11-25 ENCOUNTER — TELEPHONE (OUTPATIENT)
Dept: NEUROSURGERY | Facility: CLINIC | Age: 34
End: 2019-11-25

## 2019-12-30 ENCOUNTER — OFFICE VISIT (OUTPATIENT)
Dept: NEUROSURGERY | Facility: CLINIC | Age: 34
End: 2019-12-30
Payer: COMMERCIAL

## 2019-12-30 ENCOUNTER — HOSPITAL ENCOUNTER (OUTPATIENT)
Dept: RADIOLOGY | Facility: HOSPITAL | Age: 34
Discharge: HOME | End: 2019-12-30
Attending: NEUROLOGICAL SURGERY
Payer: COMMERCIAL

## 2019-12-30 VITALS
TEMPERATURE: 98 F | BODY MASS INDEX: 19.82 KG/M2 | HEART RATE: 73 BPM | HEIGHT: 69 IN | SYSTOLIC BLOOD PRESSURE: 118 MMHG | DIASTOLIC BLOOD PRESSURE: 71 MMHG | WEIGHT: 133.81 LBS

## 2019-12-30 DIAGNOSIS — Z98.1 S/P CERVICAL SPINAL FUSION: Primary | ICD-10-CM

## 2019-12-30 DIAGNOSIS — Z98.1 S/P CERVICAL SPINAL FUSION: ICD-10-CM

## 2019-12-30 DIAGNOSIS — M54.12 LEFT CERVICAL RADICULOPATHY: ICD-10-CM

## 2019-12-30 DIAGNOSIS — R20.0 NUMBNESS AND TINGLING IN LEFT HAND: ICD-10-CM

## 2019-12-30 DIAGNOSIS — R20.2 NUMBNESS AND TINGLING IN LEFT HAND: ICD-10-CM

## 2019-12-30 PROCEDURE — 99999 PR PBB SHADOW E&M-EST. PATIENT-LVL III: ICD-10-PCS | Mod: PBBFAC,,, | Performed by: NEUROLOGICAL SURGERY

## 2019-12-30 PROCEDURE — 99024 PR POST-OP FOLLOW-UP VISIT: ICD-10-PCS | Mod: S$GLB,,, | Performed by: NEUROLOGICAL SURGERY

## 2019-12-30 PROCEDURE — 72125 CT NECK SPINE W/O DYE: CPT | Mod: TC

## 2019-12-30 PROCEDURE — 72125 CT NECK SPINE W/O DYE: CPT | Mod: 26,,, | Performed by: RADIOLOGY

## 2019-12-30 PROCEDURE — 72125 CT CERVICAL SPINE WITHOUT CONTRAST: ICD-10-PCS | Mod: 26,,, | Performed by: RADIOLOGY

## 2019-12-30 PROCEDURE — 99999 PR PBB SHADOW E&M-EST. PATIENT-LVL III: CPT | Mod: PBBFAC,,, | Performed by: NEUROLOGICAL SURGERY

## 2019-12-30 PROCEDURE — 99024 POSTOP FOLLOW-UP VISIT: CPT | Mod: S$GLB,,, | Performed by: NEUROLOGICAL SURGERY

## 2019-12-30 NOTE — LETTER
December 30, 2019      [facility] [address]  [place] [address]  Phone: [phone]  Fax: [phone]       Patient: Eleazar Casillas   YOB: 1985  Date of Visit: 12/30/2019    To Whom It May Concern:    Faith Casillas  was at [facility] on 12/30/2019. He may return to work on 12/30/19 with no restrictions. If you have any questions or concerns, or if I can be of further assistance, please do not hesitate to contact me.    Sincerely,    Jeff Morris, DO

## 2019-12-30 NOTE — PROGRESS NOTES
CHIEF COMPLAINT:  3 month follow-up     INTERVAL HISTORY (12/30/19):  Patient returns for routine three-month postoperative follow-up.  He continues to report complete resolution of his left arm pain but complains of intermittent muscle spasms in the right trapezius which come and go.  He also notes that his neck will be sore with the cold weather.  He also reports that right-sided chin numbness that he experienced after surgery has completely resolved.  He presents today to be evaluated for full clearance back to work.      HPI:     Eleazar Casillas is a 34 y.o.-year-old male who presents today for post-operative follow-up s/p C5-6 ACDF on 9/21/19 for acute HNP.  Patient reports complete resolution of neck and arm pain.  He also reports that the numbness in his left thumb has completely resolved.  His wound is well healed.  He does note some stiffness and some cracking with head turning and head motions.  He has returned to work but is doing light duty until he gets cleared.       He additionally has intermittent sudden onset low back pain for which he sees a chiropractor.  Pain is typically in low back and does not involve any leg pain.  Currently this is not a major issue.     ROS:  As stated in the above HPI     PE:  Vitals:    12/30/19 0813   BP: 118/71   Pulse: 73   Temp: 98.4 °F (36.9 °C)       AAOX3  NAD  CN 2-12 intact      Strength:                  Deltoids Biceps Triceps Wrist Ext. Wrist Flex. Hand    RUE 5 5 5 5 5 5   LUE 5 5 5 5 5 5     Hip Flex. Knee Flex. Knee Ext. Dorsi Flex Plantar Flex EHL   RLE 5 5 5 5 5 5   LLE 5 5 5 5 5 5      Sensation:  Intact to light touch (All 4 extremities)  Intact to pin prick (All 4 extremities)     Gait:  normal     DTR:  2+ and symmetric Biceps and knees     No hyperreflexia     Cervical incision:  Well healed without any signs of infection     IMAGING:  All imaging reviewed by me.    [facility]  Good position of C5-6 ACDF hardware, column of caitlyn  fusion spanning cage  Cspine xray, 11/11/19:  Good position of C5-6 ACDF hardware and maintenance of cervical alignment     ASSESSMENT:  Problem List Items Addressed This Visit        Neuro    S/P cervical spinal fusion - Primary    Relevant Orders    X-Ray Cervical Spine AP Lat with Flexion  Extension         S/p C5-6 ACDF.   Left arm pain remains completely resolved.  Occasional right trapezius spasms to be expected.  CT shows beginning of fusion.     PLAN:   - Cleared for return to work  - RTC in 3 months with flex/ex

## 2020-01-01 RX ORDER — TIZANIDINE 4 MG/1
4 TABLET ORAL 3 TIMES DAILY PRN
Qty: 60 TABLET | Refills: 0 | Status: SHIPPED | OUTPATIENT
Start: 2020-01-01 | End: 2024-02-19

## 2020-03-23 ENCOUNTER — PATIENT MESSAGE (OUTPATIENT)
Dept: NEUROSURGERY | Facility: CLINIC | Age: 35
End: 2020-03-23

## 2020-03-31 ENCOUNTER — TELEPHONE (OUTPATIENT)
Dept: NEUROLOGY | Facility: CLINIC | Age: 35
End: 2020-03-31

## 2020-04-30 ENCOUNTER — OFFICE VISIT (OUTPATIENT)
Dept: NEUROSURGERY | Facility: CLINIC | Age: 35
End: 2020-04-30
Payer: COMMERCIAL

## 2020-04-30 ENCOUNTER — HOSPITAL ENCOUNTER (OUTPATIENT)
Dept: RADIOLOGY | Facility: HOSPITAL | Age: 35
Discharge: HOME | End: 2020-04-30
Attending: NEUROLOGICAL SURGERY
Payer: COMMERCIAL

## 2020-04-30 DIAGNOSIS — Z98.1 S/P CERVICAL SPINAL FUSION: ICD-10-CM

## 2020-04-30 DIAGNOSIS — Z98.1 S/P CERVICAL SPINAL FUSION: Primary | ICD-10-CM

## 2020-04-30 PROBLEM — M50.20 CERVICAL HERNIATED DISC: Status: RESOLVED | Noted: 2019-09-20 | Resolved: 2020-04-30

## 2020-04-30 PROCEDURE — 72050 X-RAY EXAM NECK SPINE 4/5VWS: CPT | Mod: TC,FY

## 2020-04-30 PROCEDURE — 72050 X-RAY EXAM NECK SPINE 4/5VWS: CPT | Mod: 26,,, | Performed by: RADIOLOGY

## 2020-04-30 PROCEDURE — 72050 XR CERVICAL SPINE AP LAT WITH FLEX EXTEN: ICD-10-PCS | Mod: 26,,, | Performed by: RADIOLOGY

## 2020-04-30 PROCEDURE — 99213 OFFICE O/P EST LOW 20 MIN: CPT | Mod: 95,,, | Performed by: NEUROLOGICAL SURGERY

## 2020-04-30 PROCEDURE — 99213 PR OFFICE/OUTPT VISIT, EST, LEVL III, 20-29 MIN: ICD-10-PCS | Mod: 95,,, | Performed by: NEUROLOGICAL SURGERY

## 2020-04-30 NOTE — PROGRESS NOTES
Established Patient - Audio Only Telehealth Visit     The patient location is: home  The chief complaint leading to consultation is: postop f/u  Visit type: Virtual visit with audio only (telephone)     The reason for the audio only service rather than synchronous audio and video virtual visit was related to technical difficulties or patient preference/necessity.     Each patient to whom I provide medical services by telemedicine is:  (1) informed of the relationship between the physician and patient and the respective role of any other health care provider with respect to management of the patient; and (2) notified that they may decline to receive medical services by telemedicine and may withdraw from such care at any time. Patient verbally consented to receive this service via voice-only telephone call.     CHIEF COMPLAINT:  6 month follow-up     INTERVAL HISTORY (4/30/20):   six-month postoperative follow-up status post C5-6 ACDF conducted via audio visit.  Patient reports that he is doing well and has fully recovered.  He has resumed working without any difficulties.  He does occasionally get left trapezius soreness but no further pain in the neck or the arm.  Strength and sensation are good.    INTERVAL HISTORY (12/30/19):  Patient returns for routine three-month postoperative follow-up.  He continues to report complete resolution of his left arm pain but complains of intermittent muscle spasms in the right trapezius which come and go.  He also notes that his neck will be sore with the cold weather.  He also reports that right-sided chin numbness that he experienced after surgery has completely resolved.  He presents today to be evaluated for full clearance back to work.        HPI:     Eleazar Casillas is a 34 y.o.-year-old male who presents today for post-operative follow-up s/p C5-6 ACDF on 9/21/19 for acute HNP.  Patient reports complete resolution of neck and arm pain.  He also reports that the numbness  in his left thumb has completely resolved.  His wound is well healed.  He does note some stiffness and some cracking with head turning and head motions.  He has returned to work but is doing light duty until he gets cleared.       He additionally has intermittent sudden onset low back pain for which he sees a chiropractor.  Pain is typically in low back and does not involve any leg pain.  Currently this is not a major issue.     ROS:  As stated in the above HPI     PE:  NA     IMAGING:  All imaging reviewed by me.    Cspine flex/ex, 4/30/20: Good position of C5-6 ACDF hardware, no instability  Cspine CT, 12/20/19:  Good position of C5-6 ACDF hardware, column of caitlyn fusion spanning cage  Cspine xray, 11/11/19:  Good position of C5-6 ACDF hardware and maintenance of cervical alignment     ASSESSMENT:  Problem List Items Addressed This Visit        Neuro    S/P cervical spinal fusion - Primary        S/p C5-6 ACDF.  Fully recovered, no further symptoms.  Flex/ex looks great.     PLAN:   - RTC as needed      This service was not originating from a related E/M service provided within the previous 7 days nor will  to an E/M service or procedure within the next 24 hours or my soonest available appointment.  Prevailing standard of care was able to be met in this audio-only visit.

## 2021-05-06 ENCOUNTER — OFFICE VISIT (OUTPATIENT)
Dept: NEUROSURGERY | Facility: CLINIC | Age: 36
End: 2021-05-06
Payer: COMMERCIAL

## 2021-05-06 ENCOUNTER — HOSPITAL ENCOUNTER (OUTPATIENT)
Dept: RADIOLOGY | Facility: HOSPITAL | Age: 36
Discharge: HOME | End: 2021-05-06
Attending: PHYSICIAN ASSISTANT
Payer: COMMERCIAL

## 2021-05-06 VITALS
WEIGHT: 123.69 LBS | DIASTOLIC BLOOD PRESSURE: 68 MMHG | SYSTOLIC BLOOD PRESSURE: 110 MMHG | HEIGHT: 69 IN | HEART RATE: 82 BPM | BODY MASS INDEX: 18.32 KG/M2 | OXYGEN SATURATION: 99 %

## 2021-05-06 DIAGNOSIS — Z98.1 S/P CERVICAL SPINAL FUSION: Primary | ICD-10-CM

## 2021-05-06 DIAGNOSIS — Z98.1 S/P CERVICAL SPINAL FUSION: ICD-10-CM
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
## 2021-05-06 PROCEDURE — 1125F PR PAIN SEVERITY QUANTIFIED, PAIN PRESENT: ICD-10-PCS | Mod: S$GLB,,, | Performed by: PHYSICIAN ASSISTANT

## 2021-05-06 PROCEDURE — 99214 OFFICE O/P EST MOD 30 MIN: CPT | Mod: S$GLB,,, | Performed by: PHYSICIAN ASSISTANT

## 2021-05-06 PROCEDURE — 72052 X-RAY EXAM NECK SPINE 6/>VWS: CPT | Mod: TC,FY

## 2021-05-06 PROCEDURE — 99214 PR OFFICE/OUTPT VISIT, EST, LEVL IV, 30-39 MIN: ICD-10-PCS | Mod: S$GLB,,, | Performed by: PHYSICIAN ASSISTANT

## 2021-05-06 PROCEDURE — 3008F BODY MASS INDEX DOCD: CPT | Mod: CPTII,S$GLB,, | Performed by: PHYSICIAN ASSISTANT

## 2021-05-06 PROCEDURE — 99999 PR PBB SHADOW E&M-EST. PATIENT-LVL III: CPT | Mod: PBBFAC,,, | Performed by: PHYSICIAN ASSISTANT

## 2021-05-06 PROCEDURE — 72052 X-RAY EXAM NECK SPINE 6/>VWS: CPT | Mod: 26,,, | Performed by: RADIOLOGY

## 2021-05-06 PROCEDURE — 72052 XR CERVICAL SPINE 5 VIEW WITH FLEX AND EXT: ICD-10-PCS | Mod: 26,,, | Performed by: RADIOLOGY

## 2021-05-06 PROCEDURE — 99999 PR PBB SHADOW E&M-EST. PATIENT-LVL III: ICD-10-PCS | Mod: PBBFAC,,, | Performed by: PHYSICIAN ASSISTANT

## 2021-05-06 PROCEDURE — 3008F PR BODY MASS INDEX (BMI) DOCUMENTED: ICD-10-PCS | Mod: CPTII,S$GLB,, | Performed by: PHYSICIAN ASSISTANT

## 2021-05-06 PROCEDURE — 1125F AMNT PAIN NOTED PAIN PRSNT: CPT | Mod: S$GLB,,, | Performed by: PHYSICIAN ASSISTANT

## 2021-05-06 RX ORDER — MELOXICAM 15 MG/1
15 TABLET ORAL DAILY
Qty: 30 TABLET | Refills: 0 | Status: SHIPPED | OUTPATIENT
Start: 2021-05-06 | End: 2024-02-19

## 2021-05-18 ENCOUNTER — TELEPHONE (OUTPATIENT)
Dept: NEUROSURGERY | Facility: CLINIC | Age: 36
End: 2021-05-18

## 2021-09-15 ENCOUNTER — LAB VISIT (OUTPATIENT)
Dept: PRIMARY CARE CLINIC | Facility: OTHER | Age: 36
End: 2021-09-15
Attending: INTERNAL MEDICINE
Payer: COMMERCIAL

## 2021-09-15 DIAGNOSIS — Z20.822 ENCOUNTER FOR LABORATORY TESTING FOR COVID-19 VIRUS: ICD-10-CM

## 2021-09-15 LAB
SARS-COV-2 RNA RESP QL NAA+PROBE: NOT DETECTED
SARS-COV-2- CYCLE NUMBER: NORMAL

## 2021-09-15 PROCEDURE — U0003 INFECTIOUS AGENT DETECTION BY NUCLEIC ACID (DNA OR RNA); SEVERE ACUTE RESPIRATORY SYNDROME CORONAVIRUS 2 (SARS-COV-2) (CORONAVIRUS DISEASE [COVID-19]), AMPLIFIED PROBE TECHNIQUE, MAKING USE OF HIGH THROUGHPUT TECHNOLOGIES AS DESCRIBED BY CMS-2020-01-R: HCPCS | Performed by: INTERNAL MEDICINE

## 2024-02-12 ENCOUNTER — HOSPITAL ENCOUNTER (EMERGENCY)
Facility: HOSPITAL | Age: 39
Discharge: HOME | End: 2024-02-12
Attending: EMERGENCY MEDICINE
Payer: COMMERCIAL

## 2024-02-12 ENCOUNTER — TELEPHONE (OUTPATIENT)
Dept: NEUROSURGERY | Facility: CLINIC | Age: 39
End: 2024-02-12
Payer: COMMERCIAL

## 2024-02-12 VITALS
TEMPERATURE: 98 F | HEART RATE: 87 BPM | SYSTOLIC BLOOD PRESSURE: 114 MMHG | BODY MASS INDEX: 18.51 KG/M2 | DIASTOLIC BLOOD PRESSURE: 70 MMHG | HEIGHT: 69 IN | WEIGHT: 125 LBS | RESPIRATION RATE: 15 BRPM | OXYGEN SATURATION: 98 %

## 2024-02-12 DIAGNOSIS — M54.17 LUMBOSACRAL RADICULOPATHY: Primary | ICD-10-CM

## 2024-02-12 PROCEDURE — 99284 EMERGENCY DEPT VISIT MOD MDM: CPT | Mod: 25

## 2024-02-12 PROCEDURE — 96372 THER/PROPH/DIAG INJ SC/IM: CPT | Performed by: PHYSICIAN ASSISTANT

## 2024-02-12 PROCEDURE — 25000003 PHARM REV CODE 250: Performed by: PHYSICIAN ASSISTANT

## 2024-02-12 PROCEDURE — 63600175 PHARM REV CODE 636 W HCPCS: Performed by: PHYSICIAN ASSISTANT

## 2024-02-12 RX ORDER — KETOROLAC TROMETHAMINE 30 MG/ML
15 INJECTION, SOLUTION INTRAMUSCULAR; INTRAVENOUS
Status: COMPLETED | OUTPATIENT
Start: 2024-02-12 | End: 2024-02-12

## 2024-02-12 RX ORDER — LIDOCAINE 50 MG/G
1 PATCH TOPICAL DAILY
Qty: 15 PATCH | Refills: 0 | Status: SHIPPED | OUTPATIENT
Start: 2024-02-12

## 2024-02-12 RX ORDER — OXYCODONE AND ACETAMINOPHEN 10; 325 MG/1; MG/1
1 TABLET ORAL EVERY 6 HOURS PRN
Qty: 12 TABLET | Refills: 0 | Status: SHIPPED | OUTPATIENT
Start: 2024-02-12 | End: 2024-03-06 | Stop reason: SDUPTHER

## 2024-02-12 RX ORDER — METHYLPREDNISOLONE 4 MG/1
TABLET ORAL
Qty: 21 EACH | Refills: 0 | Status: SHIPPED | OUTPATIENT
Start: 2024-02-12 | End: 2024-03-04

## 2024-02-12 RX ORDER — LIDOCAINE 50 MG/G
1 PATCH TOPICAL
Status: DISCONTINUED | OUTPATIENT
Start: 2024-02-12 | End: 2024-02-12 | Stop reason: HOSPADM

## 2024-02-12 RX ADMIN — LIDOCAINE 5% 1 PATCH: 700 PATCH TOPICAL at 12:02

## 2024-02-12 RX ADMIN — KETOROLAC TROMETHAMINE 15 MG: 30 INJECTION, SOLUTION INTRAMUSCULAR; INTRAVENOUS at 12:02

## 2024-02-12 NOTE — ED PROVIDER NOTES
Encounter Date: 2/12/2024       History     Chief Complaint   Patient presents with    Back Pain     Radiating  down right buttock and down into right leg     37 y/o male with PSx of cervical spine fusion at C5-C6 (9/2019) presents to the ED for evaluation of right sided back pain. Patient reports constant, sharp R lower back pain that radiates down R hip, thigh, and knee ongoing for the last month. He reports worsening pain upon bearing weight to the right lower extremity.      He denies any bowel or bladder dysfunction, no saddle anesthesia.  Denies any associated paresthesia or focal weakness to the extremity.  He has not had any fever chills.  No abdominal pain, no flank pain or UTI symptoms.  Pt has no hx of IVDU. Pt denies any preceding trauma or injuries. 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  Patient has tried Flexeril, Mobic, and Oxycodone for pain with no relief.    The history is provided by the patient.     Review of patient's allergies indicates:  No Known Allergies  Past Medical History:   Diagnosis Date    Cervical herniated disc 9/20/2019    Left cervical radiculopathy 9/21/2019    Numbness and tingling in left hand 9/21/2019    Radiculopathy of cervical spine 9/21/2019     Past Surgical History:   Procedure Laterality Date    ANTERIOR CERVICAL DISCECTOMY W/ FUSION  9/21/2019    Procedure: DISCECTOMY, SPINE, CERVICAL, ANTERIOR APPROACH, WITH FUSION, C5-C6;  Surgeon: Jeff Morris DO;  Location: [facility] OR [address] [place] [address];  Service: Neurosurgery;;     History reviewed. No pertinent family history.  Social History     Tobacco Use    Smoking status: Every Day     Current packs/day: 1.00     Types: Cigarettes    Smokeless tobacco: Never   Substance Use Topics    Alcohol use: Not Currently    Drug use: Not Currently     Comment: suboxoe intermittently      Review of Systems   Constitutional:  Negative for chills and fever.   HENT:  Negative  for congestion.    Eyes:  Negative for visual disturbance.   Respiratory:  Negative for shortness of breath.    Cardiovascular:  Negative for chest pain.   Gastrointestinal:  Negative for abdominal pain, nausea and vomiting.   Genitourinary:  Negative for dysuria and flank pain.   Musculoskeletal:  Positive for back pain. Negative for myalgias.   Skin:  Negative for rash.   Allergic/Immunologic: Negative for immunocompromised state.   Neurological:  Negative for weakness and numbness.   Hematological:  Does not bruise/bleed easily.   Psychiatric/Behavioral:  Negative for confusion.        Physical Exam     Initial Vitals [02/12/24 1041]   BP Pulse Resp Temp SpO2   105/66 104 16 98.1 °F (36.7 °C) 98 %      MAP       --         Physical Exam    Vitals reviewed.  Constitutional: He appears well-developed and well-nourished. He is not diaphoretic. No distress.   HENT:   Head: Normocephalic and atraumatic.   Eyes: Conjunctivae and EOM are normal.   Neck: Neck supple.   Cardiovascular:  Intact distal pulses.           Pulmonary/Chest: No respiratory distress.   Abdominal: Abdomen is soft. He exhibits no distension. There is no abdominal tenderness. There is no rebound.   Musculoskeletal:      Cervical back: Normal and neck supple.      Thoracic back: Normal.      Lumbar back: No tenderness or bony tenderness. Decreased range of motion (due to pain). Positive right straight leg raise test.     Neurological: He is alert and oriented to person, place, and time.   Skin: Skin is warm.         ED Course   Procedures  Labs Reviewed - No data to display         Imaging Results              X-Ray Lumbar Spine Ap And Lateral (Final result)  Result time 02/12/24 13:33:38      Final result by Ranjan Beyer MD (02/12/24 13:33:38)                   Impression:      No significant abnormality, with no evidence of compression fracture observed.  Appearance of the lumbar spine is quite similar to 03/24/2006, with no significant  detrimental interval change observed since that time.      Electronically signed by: Ranjan Beyer MD  Date:    02/12/2024  Time:    13:33               Narrative:    EXAMINATION:  XR LUMBAR SPINE AP AND LATERAL    CLINICAL HISTORY:  back pain;    TECHNIQUE:  Three views of the lumbar spine were obtained, with AP, lateral, and lumbosacral lateral projections submitted.    COMPARISON:  Comparison is made to 03/24/2006.  Clinical information obtained from the electronic medical record indicates back pain with radiation to the right lower extremity.    FINDINGS:  No significant alignment abnormality.  Vertebral body heights are normally maintained, without significant compression deformity at any level.  No interval disc narrowing.  Vertebral endplates are well defined.  No osseous destructive process.  SI joints appear unremarkable.                                       X-Ray Hip 2 or 3 views Right (with Pelvis when performed) (Final result)  Result time 02/12/24 13:31:53      Final result by Lilli Beasley MD (02/12/24 13:31:53)                   Impression:      No significant abnormality seen.      Electronically signed by: Lilli Beasley MD  Date:    02/12/2024  Time:    13:31               Narrative:    EXAMINATION:  XR HIP WITH PELVIS WHEN PERFORMED, 2 OR 3  VIEWS RIGHT    CLINICAL HISTORY:  hip pain;    TECHNIQUE:  AP view of the pelvis and frog leg lateral view of the right hip were performed.    COMPARISON:  None    FINDINGS:  The right hip joint space appears well preserved.    The right femoral head contour is normal.    No fracture, no osseous lesions.    The acetabular region appears normal, no sclerosis or osteophyte formation.    The remainder of the visualized osseous structures and soft tissues appear within normal limits.                                       Medications   ketorolac injection 15 mg (15 mg Intramuscular Given 2/12/24 1256)     Medical Decision Making  Differential  diagnosis:    Lumbosacral radiculopathy, herniated disc, bulging disc, fracture, muscular strain, UTI    Amount and/or Complexity of Data Reviewed  Radiology: ordered. Decision-making details documented in ED Course.    Risk  Prescription drug management.         APC / Resident Notes:   Patient seen in the ER promptly upon arrival.  He is afebrile, no acute distress.  Physical examination reveals pain on straight leg raise of the right.  Patient has ipsilateral and contralateral pain to the right.  Distal pulses intact and equal bilaterally.  No spinous process tenderness through the lumbar spine.  No step-offs or deformity.  No erythema or swelling.    Will obtain x-rays and provide pain control.        ED Course as of 02/12/24 1802   Mon Feb 12, 2024   1350 X-Ray Lumbar Spine Ap And Lateral  Impression:     No significant abnormality, with no evidence of compression fracture observed.  Appearance of the lumbar spine is quite similar to 03/24/2006, with no significant detrimental interval change observed since that time.      [AJ]   1350 X-Ray Hip 2 or 3 views Right (with Pelvis when performed)  Impression:     No significant abnormality seen.      [AJ]   1350 On reassessment patient reports some improvement with the Toradol.  I did offer IM narcotic medicine but states that he drove himself in the ER and does not want to wait for ride.  Requested for prescription instead.    Suspect symptoms are secondary to lung lumbosacral radiculopathy, possible bulging or herniated disc.    He will likely require outpatient MRI for further evaluation.  He does have a neurosurgeon that he has seen for his neck surgery that he will follow up with.      Will prescribed home on Medrol Dosepak and Percocet to use as directed.  Will advise to refrain from driving or working with the Percocet.  Until only use for breakthrough pain.  Advised to use heating pad to the lower back as needed.  Will also prescribed Lidoderm patch.      He  was however given strict return precautions ED which was agreeable to.  He is stable for discharge and close follow-up    Disclaimer: This note has been generated using voice-recognition software. There may be typographical errors that have been missed during proof-reading.     [AJ]      ED Course User Index  [AJ] Corrina Nelson PA-C                           Clinical Impression:  Final diagnoses:  [M54.17] Lumbosacral radiculopathy (Primary)          ED Disposition Condition    Discharge Stable          ED Prescriptions       Medication Sig Dispense Start Date End Date Auth. Provider    methylPREDNISolone (MEDROL DOSEPACK) 4 mg tablet use as directed 21 each 2/12/2024 3/4/2024 Corrina Nelson PA-C    oxyCODONE-acetaminophen (PERCOCET)  mg per tablet Take 1 tablet by mouth every 6 (six) hours as needed for Pain. 12 tablet 2/12/2024 -- Corrina Nelson PA-C    LIDOcaine (LIDODERM) 5 % Place 1 patch onto the skin once daily. Remove & Discard patch within 12 hours or as directed by MD 15 patch 2/12/2024 -- Corrina Nelson PA-C          Follow-up Information       Follow up With Specialties Details Why Contact Info Additional Information    [facility]   [address], [address]  [facility] [address] [phone] [facility], 4th Floor Please park in [place] and use [place] elevators             Corrina Nelson PA-C  02/12/24 5135

## 2024-02-12 NOTE — TELEPHONE ENCOUNTER
----- Message from David Serna sent at 2/12/2024  2:22 PM CST -----  Regarding: MRI Orders requested  Contact: @ [phone]  Pt is calling in wanting to know if the provider can order an Outpatient MRI done because he went to the ER today because he is having a lot of lower back pain into his leg. Pt stated that the ER provider told him to get in contact with Dr. Morris because he did the surgery. Pt states that he would like to have this done soon because he can't walk. Pt had surgery with the provider in 2019. Please call to advise

## 2024-02-12 NOTE — DISCHARGE INSTRUCTIONS
Use medication as prescribed.  Take Percocet only for breakthrough pain.  Do not drive or work with Percocet.      Please follow-up with your neurosurgeon.  I have placed a referral for the back and spine specialist as well.  Return in the emergency room with concerning or worsening symptoms.  You will likely require outpatient MRI of your back.

## 2024-02-12 NOTE — ED NOTES
Pt in hospital gown, side rails up X2, bed low and locked, and call light is placed within reach. No family/visitors at bedside at this time. Pt denies any complaints or needs.

## 2024-02-12 NOTE — ED TRIAGE NOTES
Pt presents to ED with c/o right sided sharp hip pain radiating down right thigh and knee. Pt endorses worsening of pain with walking. Pt has hx of bulging disc in neck and fusion.

## 2024-02-14 ENCOUNTER — TELEPHONE (OUTPATIENT)
Dept: NEUROSURGERY | Facility: CLINIC | Age: 39
End: 2024-02-14
Payer: COMMERCIAL

## 2024-02-14 NOTE — TELEPHONE ENCOUNTER
----- Message from Leticia Sheth sent at 2/14/2024 11:56 AM CST -----  Contact: [phone]  PATIENTCALL     Pt is calling to speak with someone in provider office regarding he want to see if he can get a sooner appt than 02/19 states he is in a lot of pain. He  is asking for a return call please call.

## 2024-02-19 ENCOUNTER — OFFICE VISIT (OUTPATIENT)
Dept: NEUROSURGERY | Facility: CLINIC | Age: 39
End: 2024-02-19
Payer: COMMERCIAL

## 2024-02-19 VITALS
WEIGHT: 125 LBS | SYSTOLIC BLOOD PRESSURE: 98 MMHG | DIASTOLIC BLOOD PRESSURE: 65 MMHG | HEART RATE: 84 BPM | HEIGHT: 69 IN | BODY MASS INDEX: 18.51 KG/M2

## 2024-02-19 DIAGNOSIS — M54.16 LUMBAR RADICULOPATHY: ICD-10-CM

## 2024-02-19 DIAGNOSIS — M54.17 LUMBOSACRAL RADICULOPATHY: ICD-10-CM

## 2024-02-19 DIAGNOSIS — M54.9 DORSALGIA, UNSPECIFIED: Primary | ICD-10-CM

## 2024-02-19 PROCEDURE — 3008F BODY MASS INDEX DOCD: CPT | Mod: CPTII,S$GLB,, | Performed by: NURSE PRACTITIONER

## 2024-02-19 PROCEDURE — 3078F DIAST BP <80 MM HG: CPT | Mod: CPTII,S$GLB,, | Performed by: NURSE PRACTITIONER

## 2024-02-19 PROCEDURE — 3074F SYST BP LT 130 MM HG: CPT | Mod: CPTII,S$GLB,, | Performed by: NURSE PRACTITIONER

## 2024-02-19 PROCEDURE — 1160F RVW MEDS BY RX/DR IN RCRD: CPT | Mod: CPTII,S$GLB,, | Performed by: NURSE PRACTITIONER

## 2024-02-19 PROCEDURE — 1159F MED LIST DOCD IN RCRD: CPT | Mod: CPTII,S$GLB,, | Performed by: NURSE PRACTITIONER

## 2024-02-19 PROCEDURE — 99213 OFFICE O/P EST LOW 20 MIN: CPT | Mod: S$GLB,,, | Performed by: NURSE PRACTITIONER

## 2024-02-19 PROCEDURE — 99999 PR PBB SHADOW E&M-EST. PATIENT-LVL IV: CPT | Mod: PBBFAC,,, | Performed by: NURSE PRACTITIONER

## 2024-02-19 RX ORDER — MELOXICAM 15 MG/1
15 TABLET ORAL DAILY PRN
Qty: 30 TABLET | Refills: 0 | Status: ON HOLD | OUTPATIENT
Start: 2024-02-19 | End: 2024-03-11 | Stop reason: HOSPADM

## 2024-02-19 RX ORDER — GABAPENTIN 300 MG/1
300 CAPSULE ORAL 3 TIMES DAILY
Qty: 90 CAPSULE | Refills: 0 | Status: SHIPPED | OUTPATIENT
Start: 2024-02-19 | End: 2024-03-20

## 2024-02-19 RX ORDER — CYCLOBENZAPRINE HCL 5 MG
5 TABLET ORAL 3 TIMES DAILY PRN
Qty: 30 TABLET | Refills: 0 | Status: SHIPPED | OUTPATIENT
Start: 2024-02-19 | End: 2024-02-29

## 2024-02-19 NOTE — PROGRESS NOTES
Neurosurgery  Established Patient    SUBJECTIVE:     History of Present Illness: Eleazar Casillas is a 38 y.o. male with hx of C5-6 ACDF with Dr. Morris in 2019. The patient is being seen in clinic today to follow-up on his recent ED visit from 2/12/24 for acute low back and right leg pain. Denies trauma. States that his job requires heavy lifting but he is uncertain the cause of the pain. Describes the pain as constant and aching down the posterior aspect of the leg. Denies left leg symptoms. Aggravating factors include standing, walking, and bending. Alleviating factors include rest. Denies weakness, b/b dysfunction, saddle anesthesia, or gait instability. Patient has tried Medrol dose gerard, Flexeril, Mobic, Lidoderm patches, and Oxycodone for pain with mild relief.     Review of patient's allergies indicates:  No Known Allergies    Current Outpatient Medications   Medication Sig Dispense Refill    LIDOcaine (LIDODERM) 5 % Place 1 patch onto the skin once daily. Remove & Discard patch within 12 hours or as directed by MD 15 patch 0    methylPREDNISolone (MEDROL DOSEPACK) 4 mg tablet use as directed 21 each 0    buprenorphine-naloxone 8-2 mg (SUBOXONE) 8-2 mg Subl Place under the tongue every 6 (six) hours as needed.      cyclobenzaprine (FLEXERIL) 5 MG tablet Take 1 tablet (5 mg total) by mouth 3 (three) times daily as needed for Muscle spasms. 30 tablet 0    gabapentin (NEURONTIN) 300 MG capsule Take 1 capsule (300 mg total) by mouth 3 (three) times daily. 90 capsule 0    meloxicam (MOBIC) 15 MG tablet Take 1 tablet (15 mg total) by mouth daily as needed for Pain. 30 tablet 0    oxyCODONE-acetaminophen (PERCOCET)  mg per tablet Take 1 tablet by mouth every 6 (six) hours as needed for Pain. (Patient not taking: Reported on 2/19/2024) 12 tablet 0     No current facility-administered medications for this visit.       Past Medical History:   Diagnosis Date    Cervical herniated disc 9/20/2019    Left cervical  "radiculopathy 9/21/2019    Numbness and tingling in left hand 9/21/2019    Radiculopathy of cervical spine 9/21/2019     Past Surgical History:   Procedure Laterality Date    ANTERIOR CERVICAL DISCECTOMY W/ FUSION  9/21/2019    Procedure: DISCECTOMY, SPINE, CERVICAL, ANTERIOR APPROACH, WITH FUSION, C5-C6;  Surgeon: Jeff Morris DO;  Location: [facility] OR [address] [place] [address];  Service: Neurosurgery;;     Family History    None       Social History     Socioeconomic History    Marital status: Single   Tobacco Use    Smoking status: Every Day     Current packs/day: 1.00     Types: Cigarettes    Smokeless tobacco: Never   Substance and Sexual Activity    Alcohol use: Not Currently    Drug use: Not Currently     Comment: suboxoe intermittently        Review of Systems    OBJECTIVE:     Vital Signs  Pulse: 84  BP: 98/65  Pain Score:   3  Height: 5' 9" (175.3 cm)  Weight: 56.7 kg (125 lb)  Body mass index is 18.46 kg/m².    Neurosurgery Physical Exam  General: well developed, well nourished, no distress.   Head: normocephalic, atraumatic  Neurologic: Alert and oriented. Thought content appropriate.  GCS: Motor: 6/Verbal: 5/Eyes: 4 GCS Total: 15  Mental Status: Awake, Alert, Oriented x 4  Language: No aphasia  Speech: No dysarthria  Cranial nerves: face symmetric, tongue midline, CN II-XII grossly intact.   Eyes: pupils equal, round, reactive to light with accomodation, EOMI.   Pulmonary: normal respirations, no signs of respiratory distress  Abdomen: soft, non-distended  Skin: Skin is warm, dry and intact.  Sensory: intact to light touch throughout  Motor Strength:Moves all extremities spontaneously with good tone.   Strength Exam Pain limited   Strength  Deltoids Triceps Biceps Wrist Extension Wrist Flexion Hand    Upper: R 5/5 5/5 5/5 5/5 5/5 5/5    L 5/5 5/5 5/5 5/5 5/5 5/5     HF KE KF DF PF EHL   Lower: R 4+/5 4+/5 5/5 5/5 5/5 5/5    L 5/5 5/5 5/5 5/5 5/5 5/5     Reflexes:   Cartagena's: Negative.     Cerebellar:   Gait " antalgic     Cervical:   ROM: Full with flexion, extension, lateral rotation and ear-to-shoulder bend.   Midline TTP: Negative.     Thoracic:  Midline TTP: Negative.     Lumbar:  Midline TTP: Negative.  Straight Leg Test: Positive RIGHT    Other:  SI joint TTP: Positive RIGHT.    Diagnostic Results:  I have personally reviewed the x-ray lumbar spine dated 2/12/24, which shows no acute abnormalities.      ASSESSMENT/PLAN:   Eleazar Casillas is a 38 y.o. male with hx of C5-6 ACDF with Dr. Morris in 2019. The patient is being seen in clinic today to follow-up on his recent ED visit from 2/12/24 for acute low back and right leg pain despite conservative treatment. I have added a couple more medications to assist with his radicular complaints. A MRI lumbar spine has been ordered to assess for stenosis contributing to his weakness and complaints. Referral to PT has also been placed. The patient would like to follow-up in clinic with Dr. Morris . I have encouraged him to contact the clinic with any questions, concerns, or adverse clinical changes. He verbalized understanding.      BARRIE Hendrix  Neurosurgery  [facility].      Note dictated with voice recognition software, please excuse any grammatical errors.

## 2024-02-20 ENCOUNTER — CLINICAL SUPPORT (OUTPATIENT)
Dept: REHABILITATION | Facility: HOSPITAL | Age: 39
End: 2024-02-20
Payer: COMMERCIAL

## 2024-02-20 DIAGNOSIS — M54.41 ACUTE RIGHT-SIDED LOW BACK PAIN WITH RIGHT-SIDED SCIATICA: ICD-10-CM

## 2024-02-20 DIAGNOSIS — M54.9 DORSALGIA, UNSPECIFIED: Primary | ICD-10-CM

## 2024-02-20 DIAGNOSIS — M54.16 LUMBAR RADICULOPATHY: ICD-10-CM

## 2024-02-20 DIAGNOSIS — R26.9 GAIT ABNORMALITY: ICD-10-CM

## 2024-02-20 PROCEDURE — 97161 PT EVAL LOW COMPLEX 20 MIN: CPT

## 2024-02-20 PROCEDURE — 97110 THERAPEUTIC EXERCISES: CPT

## 2024-02-20 NOTE — PROGRESS NOTES
[facility]  Physical Therapy Initial Evaluation    Name: Eleazar Casillas  Clinic Number: [id number]    Therapy Diagnosis:   Encounter Diagnoses   Name Primary?    Dorsalgia, unspecified     Lumbar radiculopathy      Physician: Damaris Whitmore NP    Physician Orders: PT Eval and Treat  Medical Diagnosis from Referral:   M54.9 (ICD-10-CM) - Dorsalgia, unspecified   M54.16 (ICD-10-CM) - Lumbar radiculopathy     Evaluation Date: 2/20/2024  Authorization Period Expiration: 12/31/2024  Plan of Care Expiration: 5/20/2024  Visit # / Visits authorized: 1/1    FOTO: 24  FOTO 1st Follow-up: NA  FOTO 2nd Follow-up: NA    Time In: 1400  Time Out: 1500  Total Billable Time: 60 minutes    Precautions: Standard    Subjective     Date of onset: 2 months ago  History of current condition - Eleazar reports: R buttock pain that radiates down RLE to knee that began with insidious onset approximately 2 months ago. Pt sates that pain originates in R buttock, and feels like a bruise all the way down to R knee, but denies any N/T. No specific mechanism that pt can recall, but states RLE pain was getting progressively worse until it was so severe he was unable to move, prompting him to go to the emergency room on 2/12/2024. States he received a torodol injection into the RUE while at the hospital, which relieved his RLE pain. 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 Pt states that he began to have slight N/T sensation into RLE with back pain related to lifting at work, but states his LBP and N/T have since resolved, and his radiating R buttock pain has remained the same. Pt states he is unable to sit on firm surfaces without shifting his weight to his L buttock, due to pain with sitting on R buttock. Pt also states he  "has been laying in bed most of the time since visit to emergency room due to fear of causing further damage to any structures. Pt states he would like to get back to work without pain, and states his boss would like for him to return to light duty work tomorrow if possible.     [id number]  Impression:     No significant abnormality, with no evidence of compression fracture observed.  Appearance of the lumbar spine is quite similar to 2006, with no significant detrimental interval change observed since that time.    FINDINGS:  The right hip joint space appears well preserved.     The right femoral head contour is normal.     No fracture, no osseous lesions.     The acetabular region appears normal, no sclerosis or osteophyte formation.     The remainder of the visualized osseous structures and soft tissues appear within normal limits.     Impression:     No significant abnormality seen.    Prior Therapy: None  Social History: Lives alone but has been able to complete all household tasks with pain  Occupation: Works for an instillation company installing A/C units  Prior Level of Function: Able to complete all work activities and ADLs without pain  Current Level of Function: Has only been able to lay in bed at home since onset of pain due to fear of damaging anything further. Unable to sit or lay on R side due to R buttock pain    Pain:  Current 8/10, worst 10/10, best 8/10   Location: right buttock, radiating down posterior thigh to knee  Description: "Feels like a bruise  Aggravating Factors: Sitting, Bending, Walking, and laying or sitting on R side on firm surfaces  Easing Factors: pain medication, heating pad, and rest    Pts Goals: Get back to work without pain    Medical History:   Past Medical History:   Diagnosis Date    Cervical herniated disc 2019    Left cervical radiculopathy 2019    Numbness and tingling in left hand 2019    Radiculopathy of cervical spine 2019 "       Surgical History:   Eleazar Casillas  has a past surgical history that includes Anterior cervical discectomy w/ fusion (9/21/2019).    Medications:   Eleazar has a current medication list which includes the following prescription(s): buprenorphine-naloxone 8-2, cyclobenzaprine, gabapentin, lidocaine, meloxicam, methylprednisolone, and oxycodone-acetaminophen.    Allergies:   Review of patient's allergies indicates:  No Known Allergies     Objective     Observation: Pt weight shift away from R side when sitting due to pain. Ambulates with antalgic gait on RLE    Lumbar ROM:    ROM Pain   Flexion 50% P!   Extension 50% P!   L Side Bending 100%    R Side Bending 50% P!       Lower Extremity Strength  Right LE  Left LE    Quadriceps: 5/5 Quadriceps: 5/5   Hamstrings: 5/5 Hamstrings: 5/5   Iliospoas: 4+/5 Iliospoas: 5/5     Sensation: Unremarkable    Special Tests:  -SLR Test: L: -; R +    Joint Mobility:  Unable to test due to pt being unable to lay in prone position 2/2 pain    Palpation:  Pt TTP on insertion of R piriformis on greater trochanter, and R piriformis muscle belly      Intake Outcome Measure for FOTO Low Back Survey    Therapist reviewed FOTO scores for Eleazar Casillas on 2/20/2024.   FOTO documents entered into [employer] - see Media section.    Intake Score: 24%     Treatment     Treatment Time In: 1440  Treatment Time Out: 1500  Total Treatment time separate from Evaluation: 20 minutes    Eleazar received the treatments listed below:      Therapeutic exercises to develop strength, endurance, ROM, flexibility, and core stabilization for 20 minutes including:    Supine marches 3 x 10 ea bailee  PPT 2 x 10 with 5 sec hold  SL clamshells RLE only 2 x 10    Manual therapy techniques: Joint mobilizations, Myofacial release, and Soft tissue Mobilization were applied to the: low back for 0 minutes, including:      Neuromuscular re-education activities to improve: Balance, Coordination, and  Proprioception for 0 minutes. The following activities were included:       Therapeutic activities to improve functional performance for 0 minutes, including:      Home Exercises and Patient Education Provided     Education provided:   - Pt educated on importance of mobility in treating low back and buttock pain  - Prognosis, activity modification, goals for therapy, role of therapy for care, exercises/HEP    Written Home Exercises Provided:  Exercises were reviewed and Eleazar was able to demonstrate them prior to the end of the session.   Pt received a written copy of exercises to perform at home. Eleazar demonstrated good understanding of the education provided.     See EMR under patient instructions for exercises given.     Assessment     Eleazar is a 38 y.o. male referred to outpatient Physical Therapy with R buttock pain radiating down RLE to R knee that began with insidious onset approximately 2 months ago. Pt presents with decreased tolerance to all movement 2/2 pain, TTP over R piriformis insertion and muscle belly, and decreased lumbar spine ROM 2/2 pain. Pt reports relief of pain with R hip abduction exercises. Unable to complete full objective exam today 2/2 pt being unable to tolerate sitting and laying, as well as being unable to tolerate movements such as active R knee flexion without severe RLE pain. Lumbar spine palpation and tests did not reproduce any of pt's symptoms, though SLR was positive on R, suggesting possible peripheral entrapment of R sciatic nerve. Will continue to perform light mobility, progressing to more active mobility in order to decrease pain and sensitivity so that pt is able to tolerate more activity to perform more in depth assessment and exam and program exercises appropriately.     Pt will benefit from skilled outpatient Physical Therapy to address the deficits stated above and in the chart below, provide pt/family education, and to maximize pt's level of  independence. Pt prognosis is Good.     Plan of care discussed with patient: Yes  Pt's spiritual, cultural and educational needs considered and patient is agreeable to the plan of care and goals as stated below:     Anticipated Barriers for therapy: Fear avoidance, high sensitivty    Medical Necessity is demonstrated by the following:    History  Co-morbidities and personal factors that may impact the plan of care [x] LOW: no personal factors / co-morbidities  [] MODERATE: 1-2 personal factors / co-morbidities  [] HIGH: 3+ personal factors / co-morbidities    Moderate / High Support Documentation:   Co-morbidities affecting plan of care:     Personal Factors:   No deficits     Examination  Body Structures and Functions, activity limitations and participation restrictions that may impact the plan of care [] LOW: addressing 1-2 elements  [] MODERATE: 3+ elements  [x] HIGH: 4+ elements (please support below)    Moderate / High Support Documentation: R glute pain, Fear avoidance behavior, Radiating RLE pain, Decreased lumbar mobility     Clinical Presentation [x] LOW: stable  [] MODERATE: Evolving  [] HIGH: Unstable     Decision Making/ Complexity Score: low       GOALS   Short Term Goals: 5 weeks  Pt will report decreased RLE pain  < / =  4/10  to increase tolerance for ADL performance and recreational routine.  Pt will improve lumbar spine ROM to WFL in order to be able to perform ADLs without difficulty.  Pt will improve RLE strength by 1/3 MMT grade to increase tolerance for ADL and work activities  Pt will demonstrate tolerance to HEP to improve independence with ADL's    Long Term Goals: 10 weeks  Pt will report decreased RLE pain  < / =  0/10  to increase tolerance for ADL performance and recreational routine  Pt will improve lumbar spine ROM to WNL in order to be able to perform ADLs without difficulty  Pt will improve RLE strength by 1/3 MMT grade to increase tolerance for ADL and work activities  Pt will  report at CJ level (20-40% impaired) on FOTO low back to demonstrate increase in LE function with every day tasks.     Plan   Plan of care Certification: 2/20/2024 to 5/20/2024.    Outpatient Physical Therapy 2 times weekly for 10 weeks to include the following interventions: Gait Training, Manual Therapy, Moist Heat/ Ice, Neuromuscular Re-ed, Patient Education, Therapeutic Activites, Therapeutic Exercise, and Functional Dry Needling with/or without Electrical Stimulation as needed.     Jan Larkin SPT , DPT, OCS  Board Certified in Orthopedic Physical Therapy     Co-treat with: ANN Palomino    I certify that I was present in the room directing the student in service delivery and guiding them using my skilled judgment. As the co-signing therapist I have reviewed the students documentation and am responsible for the treatment, assessment, and plan.

## 2024-02-22 ENCOUNTER — CLINICAL SUPPORT (OUTPATIENT)
Dept: REHABILITATION | Facility: HOSPITAL | Age: 39
End: 2024-02-22
Payer: COMMERCIAL

## 2024-02-22 DIAGNOSIS — M54.41 ACUTE RIGHT-SIDED LOW BACK PAIN WITH RIGHT-SIDED SCIATICA: Primary | ICD-10-CM

## 2024-02-22 DIAGNOSIS — R26.9 GAIT ABNORMALITY: ICD-10-CM

## 2024-02-22 PROBLEM — M54.42 ACUTE LEFT-SIDED LOW BACK PAIN WITH LEFT-SIDED SCIATICA: Status: ACTIVE | Noted: 2024-02-22

## 2024-02-22 PROBLEM — R29.898 LEFT LEG WEAKNESS: Status: RESOLVED | Noted: 2024-02-22 | Resolved: 2024-02-22

## 2024-02-22 PROBLEM — R29.898 LEFT LEG WEAKNESS: Status: ACTIVE | Noted: 2024-02-22

## 2024-02-22 PROBLEM — M54.42 ACUTE LEFT-SIDED LOW BACK PAIN WITH LEFT-SIDED SCIATICA: Status: RESOLVED | Noted: 2024-02-22 | Resolved: 2024-02-22

## 2024-02-22 PROCEDURE — 97112 NEUROMUSCULAR REEDUCATION: CPT

## 2024-02-22 NOTE — PROGRESS NOTES
"Physical Therapy Daily Treatment Note     Name: Eleazar Casillas  Clinic Number: [id number]    Therapy Diagnosis: No diagnosis found.  Physician: Damaris Whitmore NP    Visit Date: 2/22/2024    Physician Orders: PT Eval and Treat  Medical Diagnosis from Referral:   M54.9 (ICD-10-CM) - Dorsalgia, unspecified   M54.16 (ICD-10-CM) - Lumbar radiculopathy      Evaluation Date: 2/20/2024  Authorization Period Expiration: 12/31/2024  Plan of Care Expiration: 5/20/2024  Visit # / Visits authorized: 1/20 (+Eval)    FOTO 1: 56  FOTO 2: NA  FOTO 3: NA    Time In: 0905  Time Out: 1000  Total Billable Time: 55 minutes    Precautions: Standard    Subjective     Pt reports first return visit. States his R buttock and leg feel better since last visit, and he feels the active motions with HEP exercises have helped "loosen up" his R hip. States he was able to go to work yesterday, but he only took notes, and was standing from 7:30-11 while working yesterday, and was able to tolerate extended periods of standing, but was sore after. Also reports his R hip and leg were very sore following initial evaluation.   He was compliant with home exercise program.  Response to previous treatment: First return visit  Functional change: First return visit    Pain: 0/10 at rest; 8/10 at worst with certain movements  Location: right buttocks      Objective     Observation: Pt grimacing to stand when entering from the waiting room. Severely antalgic gait.  Dyscoordinate movements throughout evaluation 2/2 pain. Pt weight shift away from R side when sitting due to pain. Ambulates with antalgic gait on RLE     Lumbar ROM:     ROM Pain   Flexion 50% P!   Extension 50% P!   L Side Bending 100%     R Side Bending 50% P!         Lower Extremity Strength  Right LE   Left LE     Quadriceps: 5/5 Quadriceps: 5/5   Hamstrings: 5/5 Hamstrings: 5/5   Iliospoas: 4+/5 Iliospoas: 5/5      Sensation: Unremarkable     Special Tests:  -SLR Test: L: -; R +     Joint " "Mobility:  Unable to test due to pt being unable to lay in prone position 2/2 pain     Palpation:  TTP on insertion of R piriformis on greater trochanter, and R piriformis muscle belly       Treatment     Eleazar received therapeutic exercises to develop strength, ROM, flexibility, and core stabilization for 0 minutes including:      Eleazar received the following manual therapy techniques: Joint mobilizations, Myofacial release, and Soft tissue Mobilization were applied to the: R hip for 0 minutes, including:      Eleazar participated in neuromuscular re-education activities to improve: Balance, Coordination, Sense, and Proprioception for 55 minutes. The following activities were included:  SKTC  x 5 with 20s hold ea bailee  Supine piriformis stretch 3 x 30s hold ea bailee  Supine sciatic nerve glides 3 x 10 RLE  Supine DL bridges with YTB 3 x 10  Step ups 3" step 4 x 8 ea bailee  Side stepping 4 x 5 steps ea direciton    Eleazar participated in dynamic functional therapeutic activities to improve functional performance for 0 minutes, including:       Home Exercises and Patient Education Provided     Education provided:   - Pt educated on importance of maintaining active mobility    Written Home Exercises Provided: Patient instructed to cont prior HEP.  Exercises were reviewed and Eleazar was able to demonstrate them prior to the end of the session.  Eleazar demonstrated good  understanding of the education provided.     See EMR under Patient Instructions for exercises provided prior visit.    Assessment     Pt with improved affect towards pain and active mobility today. Improved tolerance to transitional movements, as well as sitting and laying on R buttock. Demonstrated positive SLR on RLE today, and reported decreased symptoms following sciatic nerve glides on RLE. Overall tolerated all exercises well, stating that active movements decreased his pain, but continued to complain of severe R buttock " pain with certain movements, but stated it was unclear exactly which movements caused pain.    Eleazar Is progressing well towards his goals.   Pt prognosis is Good.     Pt will continue to benefit from skilled outpatient physical therapy to address the deficits listed in the problem list box on initial evaluation, provide pt/family education and to maximize pt's level of independence in the home and community environment.     Pt's spiritual, cultural and educational needs considered and pt agreeable to plan of care and goals.    Anticipated barriers to physical therapy: Fear avoidance, High sensitivity    Goals:   Short Term Goals: 4 weeks  Pt will report decreased RLE pain  < / =  4/10  to increase tolerance for ADL performance and recreational routine.  Pt will improve lumbar spine ROM to WFL in order to be able to perform ADLs without difficulty.  Pt will improve RLE strength by 1/3 MMT grade to increase tolerance for ADL and work activities  Pt will demonstrate tolerance to HEP to improve independence with ADL's     Long Term Goals: 8 weeks  Pt will report decreased RLE pain  < / =  0/10  to increase tolerance for ADL performance and recreational routine  Pt will improve lumbar spine ROM to WNL in order to be able to perform ADLs without difficulty  Pt will improve RLE strength by 1/3 MMT grade to increase tolerance for ADL and work activities  Pt will report at CJ level (20-40% impaired) on FOTO low back to demonstrate increase in LE function with every day tasks.     Plan     Continue per POC      Jan Larkin SPT, DPT  Board Certified in Orthopedic Physical Therapy     Co-treat with: ANN Palomino    I certify that I was present in the room directing the student in service delivery and guiding them using my skilled judgment. As the co-signing therapist I have reviewed the students documentation and am responsible for the treatment, assessment, and plan.

## 2024-02-22 NOTE — PLAN OF CARE
[facility]  Physical Therapy Initial Evaluation    Name: Eleazar Casillas  Clinic Number: [id number]    Therapy Diagnosis:   Encounter Diagnoses   Name Primary?    Dorsalgia, unspecified     Lumbar radiculopathy     Acute left-sided low back pain with left-sided sciatica Yes    Left leg weakness     Gait abnormality      Physician: Damaris Whitmore, NP    Physician Orders: PT Eval and Treat  Medical Diagnosis from Referral:   M54.9 (ICD-10-CM) - Dorsalgia, unspecified   M54.16 (ICD-10-CM) - Lumbar radiculopathy     Evaluation Date: 2/20/2024  Authorization Period Expiration: 12/31/2024  Plan of Care Expiration: 5/20/2024  Visit # / Visits authorized: 1/1    FOTO: 56  FOTO 1st Follow-up: NA  FOTO 2nd Follow-up: NA    Time In: 1400  Time Out: 1500  Total Billable Time: 60 minutes    Precautions: Standard    Subjective     Date of onset: 2 months ago  History of current condition - Eleazar reports: R buttock pain that radiates down RLE to knee that began with insidious onset approximately 2 months ago. Pt sates that pain originates in R buttock, and feels like a bruise all the way down to R knee, but denies any N/T. No specific mechanism that pt can recall, but states RLE pain was getting progressively worse until it was so severe he was unable to move, prompting him to go to the emergency room on 2/12/2024. States he received a Toradol injection while at the hospital, which relieved his RLE pain. 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 Pt states that he began to have slight N/T sensation into RLE with back pain related to lifting at work, but states his LBP and N/T have since resolved, and his radiating R buttock pain has remained the same. Pt states he is unable to sit on firm surfaces without  "shifting his weight to his L buttock, due to pain with sitting on R buttock. Pt also states he has been laying in bed most of the time since visit to emergency room due to fear of causing further damage to any structures.     Imaging: X-ray Hip and L-sp (2/12/2024)    Impression:  No significant abnormality, with no evidence of compression fracture observed.  Appearance of the lumbar spine is quite similar to 03/24/2006, with no significant detrimental interval change observed since that time.     Impression:  No significant abnormality seen.    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 Unable to sit or lay on R side due to R buttock pain    Pain:  Current 8/10, worst 10/10, best 8/10   Location: right buttock, radiating down posterior thigh to knee  Description: "Feels like a bruise"  Aggravating Factors: Sitting, Bending, Walking, and laying or sitting on R side on firm surfaces  Easing Factors: pain medication, heating pad, and rest    Pts Goals: Get back to work without pain    Medical History:   Past Medical History:   Diagnosis Date    Cervical herniated disc 9/20/2019    Left cervical radiculopathy 9/21/2019    Numbness and tingling in left hand 9/21/2019    Radiculopathy of cervical spine 9/21/2019       Surgical History:   Eleazar Casillas  has a past surgical history that includes Anterior cervical discectomy w/ fusion (9/21/2019).    Medications:   Eleazar has a current medication list which includes the following prescription(s): buprenorphine-naloxone 8-2, cyclobenzaprine, gabapentin, lidocaine, meloxicam, methylprednisolone, and " oxycodone-acetaminophen.    Allergies:   Review of patient's allergies indicates:  No Known Allergies     Objective     Observation: Pt grimacing to stand when entering from the waiting room. Severely antalgic gait.  Dyscoordinate movements throughout evaluation 2/2 pain. Pt weight shift away from R side when sitting due to pain. Ambulates with antalgic gait on RLE    Lumbar ROM:    ROM Pain   Flexion 50% P!   Extension 50% P!   L Side Bending 100%    R Side Bending 50% P!       Lower Extremity Strength  Right LE  Left LE    Quadriceps: 5/5 Quadriceps: 5/5   Hamstrings: 5/5 Hamstrings: 5/5   Iliospoas: 4+/5 Iliospoas: 5/5     Sensation: Unremarkable    Special Tests:  -SLR Test: L: -; R +    Joint Mobility:  Unable to test due to pt being unable to lay in prone position 2/2 pain    Palpation:  TTP on insertion of R piriformis on greater trochanter, and R piriformis muscle belly    Intake Outcome Measure for FOTO Low Back Survey    Therapist reviewed FOTO scores for Eleazar Casillas on 2/20/2024.   FOTO documents entered into [employer] - see Media section.    Intake Score: 24%     Treatment     Treatment Time In: 1400  Treatment Time Out: 1500  Total Treatment time separate from Evaluation: 20 minutes    Eleazar received the treatments listed below:      Therapeutic exercises to develop strength, endurance, ROM, flexibility, and core stabilization for 20 minutes including:    Supine marches 3 x 10 ea bailee  PPT 2 x 10 with 5 sec hold  SL clamshells RLE only 2 x 10    Manual therapy techniques: Joint mobilizations, Myofacial release, and Soft tissue Mobilization were applied to the: low back for 0 minutes, including:      Neuromuscular re-education activities to improve: Balance, Coordination, and Proprioception for 0 minutes. The following activities were included:     Therapeutic activities to improve functional performance for 0 minutes, including:    Home Exercises and Patient Education Provided     Education  provided:   - Pt educated on importance of mobility in treating low back and buttock pain  - Prognosis, activity modification, goals for therapy, role of therapy for care, exercises/HEP    Written Home Exercises Provided:  Exercises were reviewed and Eleazar was able to demonstrate them prior to the end of the session.   Pt received a written copy of exercises to perform at home. Eleazar demonstrated good understanding of the education provided.     See EMR under patient instructions for exercises given.     Assessment     Eleazar is a 38 y.o. male referred to outpatient Physical Therapy with R buttock pain radiating down RLE to R knee that began with insidious onset approximately 2 months ago. Pt presents with decreased tolerance to all movement 2/2 pain, TTP over R piriformis insertion and muscle belly, and decreased lumbar spine ROM 2/2 pain. Pt reports relief of pain with R hip abduction exercises. Unable to complete full objective exam today 2/2 pt being unable to tolerate sitting and laying, as well as being unable to tolerate movements such as active R knee flexion without severe RLE pain. Lumbar spine palpation and tests did not reproduce any of pt's symptoms, though SLR was positive on R, suggesting possible peripheral entrapment of R sciatic nerve. Will continue to perform light mobility, progressing to more active mobility in order to decrease pain and sensitivity so that pt is able to tolerate more activity to perform more in depth assessment and exam and program exercises appropriately.     Pt will benefit from skilled outpatient Physical Therapy to address the deficits stated above and in the chart below, provide pt/family education, and to maximize pt's level of independence. Pt prognosis is Good.     Plan of care discussed with patient: Yes  Pt's spiritual, cultural and educational needs considered and patient is agreeable to the plan of care and goals as stated below:     Anticipated  Barriers for therapy: Fear avoidance, high sensitivty    Medical Necessity is demonstrated by the following:    History  Co-morbidities and personal factors that may impact the plan of care [x] LOW: no personal factors / co-morbidities  [] MODERATE: 1-2 personal factors / co-morbidities  [] HIGH: 3+ personal factors / co-morbidities    Moderate / High Support Documentation:   Co-morbidities affecting plan of care:     Personal Factors:   No deficits     Examination  Body Structures and Functions, activity limitations and participation restrictions that may impact the plan of care [] LOW: addressing 1-2 elements  [] MODERATE: 3+ elements  [x] HIGH: 4+ elements (please support below)    Moderate / High Support Documentation: R glute pain, Fear avoidance behavior, Radiating RLE pain, Decreased lumbar mobility     Clinical Presentation [x] LOW: stable  [] MODERATE: Evolving  [] HIGH: Unstable     Decision Making/ Complexity Score: low       GOALS   Short Term Goals: 4 weeks  Pt will report decreased RLE pain  < / =  4/10  to increase tolerance for ADL performance and recreational routine.  Pt will improve lumbar spine ROM to WFL in order to be able to perform ADLs without difficulty.  Pt will improve RLE strength by 1/3 MMT grade to increase tolerance for ADL and work activities  Pt will demonstrate tolerance to HEP to improve independence with ADL's    Long Term Goals: 8 weeks  Pt will report decreased RLE pain  < / =  0/10  to increase tolerance for ADL performance and recreational routine  Pt will improve lumbar spine ROM to WNL in order to be able to perform ADLs without difficulty  Pt will improve RLE strength by 1/3 MMT grade to increase tolerance for ADL and work activities  Pt will report at CJ level (20-40% impaired) on FOTO low back to demonstrate increase in LE function with every day tasks.     Plan   Plan of care Certification: 2/20/2024 to 5/20/2024.    Outpatient Physical Therapy 2 times weekly for 8  weeks to include the following interventions: Gait Training, Manual Therapy, Moist Heat/ Ice, Neuromuscular Re-ed, Patient Education, Therapeutic Activites, Therapeutic Exercise, and Functional Dry Needling with/or without Electrical Stimulation as needed.     Eleazar Doyle, PT , DPT, OCS  Board Certified in Orthopedic Physical Therapy     Co-treat with: Edmundo Larkin, SPT    I certify that I was present in the room directing the student in service delivery and guiding them using my skilled judgment. As the co-signing therapist I have reviewed the students documentation and am responsible for the treatment, assessment, and plan.

## 2024-02-23 NOTE — PROGRESS NOTES
"Physical Therapy Daily Treatment Note     Name: Eleazar Casillas  Clinic Number: [id number]    Therapy Diagnosis:   Encounter Diagnoses   Name Primary?    Acute right-sided low back pain with right-sided sciatica Yes    Gait abnormality      Physician: Damaris Whitmore NP    Visit Date: 2/22/2024    Physician Orders: PT Eval and Treat  Medical Diagnosis from Referral:   M54.9 (ICD-10-CM) - Dorsalgia, unspecified   M54.16 (ICD-10-CM) - Lumbar radiculopathy      Evaluation Date: 2/20/2024  Authorization Period Expiration: 12/31/2024  Plan of Care Expiration: 5/20/2024  Visit # / Visits authorized: 1/20 (+Eval)    FOTO 1: 56  FOTO 2: NA  FOTO 3: NA    Time In: 0905  Time Out: 1005  Total Billable Time: 60 minutes    Precautions: Standard    Subjective     Pt reports first return visit. States his R buttock and leg feel better since last visit, and he feels the active motions with HEP exercises have helped "loosen up" his R hip. States he was able to go to work yesterday, but he only took notes, and was standing from 7:30-11 while working yesterday, and was able to tolerate extended periods of standing, but was sore after. Also reports his R hip and leg were very sore following initial evaluation.   He was compliant with home exercise program.  Response to previous treatment: First return visit  Functional change: First return visit    Pain: 0/10 at rest; 8/10 at worst with certain movements  Location: right buttocks      Objective     Objective Measures updated at progress report unless specified     Treatment     PT Tech Extender utilized under supervision throughout tx session    Eleazar participated in neuromuscular re-education activities to improve: Balance, Coordination, Sense, and Proprioception for 55 minutes. The following activities were included:  SKTC  x 5 with 20s hold ea bialee  Supine piriformis stretch 3 x 30s hold ea bailee  Supine sciatic nerve glides 3 x 10 RLE  Supine DL bridges with YTB 3 x 10  Step " "ups 3" step 4 x 8 ea bailee  Side stepping 4 x 5 steps lexii cooper    Eleazar participated in dynamic functional therapeutic activities to improve functional performance for 0 minutes, including:       Home Exercises and Patient Education Provided     Education provided:   - Pt educated on importance of maintaining active mobility    Written Home Exercises Provided: Patient instructed to cont prior HEP.  Exercises were reviewed and Eleazar was able to demonstrate them prior to the end of the session.  Eleazar demonstrated good  understanding of the education provided.     See EMR under Patient Instructions for exercises provided prior visit.    Assessment     Pt with improved affect towards pain and active mobility today. Improved tolerance to transitional movements, as well as sitting and laying on R buttock. Demonstrated positive SLR on RLE today, and reported decreased symptoms following sciatic nerve glides on RLE. Overall tolerated all exercises well, stating that active movements decreased his pain, but continued to complain of severe R buttock pain with certain movements, but stated it was unclear exactly which movements caused pain.    Eleazar Is progressing well towards his goals.   Pt prognosis is Good.     Pt will continue to benefit from skilled outpatient physical therapy to address the deficits listed in the problem list box on initial evaluation, provide pt/family education and to maximize pt's level of independence in the home and community environment.     Pt's spiritual, cultural and educational needs considered and pt agreeable to plan of care and goals.    Anticipated barriers to physical therapy: Fear avoidance, High sensitivity    Goals:   Short Term Goals: 4 weeks  Pt will report decreased RLE pain  < / =  4/10  to increase tolerance for ADL performance and recreational routine.  Pt will improve lumbar spine ROM to WFL in order to be able to perform ADLs without difficulty.  Pt " will improve RLE strength by 1/3 MMT grade to increase tolerance for ADL and work activities  Pt will demonstrate tolerance to HEP to improve independence with ADL's     Long Term Goals: 8 weeks  Pt will report decreased RLE pain  < / =  0/10  to increase tolerance for ADL performance and recreational routine  Pt will improve lumbar spine ROM to WNL in order to be able to perform ADLs without difficulty  Pt will improve RLE strength by 1/3 MMT grade to increase tolerance for ADL and work activities  Pt will report at CJ level (20-40% impaired) on FOTO low back to demonstrate increase in LE function with every day tasks.     Plan     Continue per POC    Eleazar Doyle, PT, DPT  Board Certified in Orthopedic Physical Therapy     Co-treat with: Edmundo Larkin, SPT    I certify that I was present in the room directing the student in service delivery and guiding them using my skilled judgment. As the co-signing therapist I have reviewed the students documentation and am responsible for the treatment, assessment, and plan.

## 2024-03-05 ENCOUNTER — HOSPITAL ENCOUNTER (OUTPATIENT)
Dept: RADIOLOGY | Facility: HOSPITAL | Age: 39
Discharge: HOME | End: 2024-03-05
Attending: NURSE PRACTITIONER
Payer: COMMERCIAL

## 2024-03-05 DIAGNOSIS — M54.16 LUMBAR RADICULOPATHY: ICD-10-CM

## 2024-03-05 PROCEDURE — 72148 MRI LUMBAR SPINE W/O DYE: CPT | Mod: 26,,, | Performed by: RADIOLOGY

## 2024-03-05 PROCEDURE — 72148 MRI LUMBAR SPINE W/O DYE: CPT | Mod: TC

## 2024-03-06 ENCOUNTER — OFFICE VISIT (OUTPATIENT)
Dept: NEUROSURGERY | Facility: CLINIC | Age: 39
End: 2024-03-06
Payer: COMMERCIAL

## 2024-03-06 VITALS — HEART RATE: 76 BPM | DIASTOLIC BLOOD PRESSURE: 67 MMHG | SYSTOLIC BLOOD PRESSURE: 106 MMHG

## 2024-03-06 DIAGNOSIS — M51.26 LUMBAR HERNIATED DISC: ICD-10-CM

## 2024-03-06 DIAGNOSIS — M54.16 ACUTE RIGHT LUMBAR RADICULOPATHY: Primary | ICD-10-CM

## 2024-03-06 DIAGNOSIS — M54.17 LUMBOSACRAL RADICULOPATHY: ICD-10-CM

## 2024-03-06 PROCEDURE — 3078F DIAST BP <80 MM HG: CPT | Mod: CPTII,S$GLB,, | Performed by: NEUROLOGICAL SURGERY

## 2024-03-06 PROCEDURE — 3074F SYST BP LT 130 MM HG: CPT | Mod: CPTII,S$GLB,, | Performed by: NEUROLOGICAL SURGERY

## 2024-03-06 PROCEDURE — 99215 OFFICE O/P EST HI 40 MIN: CPT | Mod: S$GLB,,, | Performed by: NEUROLOGICAL SURGERY

## 2024-03-06 PROCEDURE — 1160F RVW MEDS BY RX/DR IN RCRD: CPT | Mod: CPTII,S$GLB,, | Performed by: NEUROLOGICAL SURGERY

## 2024-03-06 PROCEDURE — 1159F MED LIST DOCD IN RCRD: CPT | Mod: CPTII,S$GLB,, | Performed by: NEUROLOGICAL SURGERY

## 2024-03-06 PROCEDURE — 99999 PR PBB SHADOW E&M-EST. PATIENT-LVL III: CPT | Mod: PBBFAC,,, | Performed by: NEUROLOGICAL SURGERY

## 2024-03-06 RX ORDER — HYDROCODONE BITARTRATE AND ACETAMINOPHEN 5; 325 MG/1; MG/1
1 TABLET ORAL EVERY 8 HOURS PRN
Qty: 15 TABLET | Refills: 0 | Status: SHIPPED | OUTPATIENT
Start: 2024-03-06

## 2024-03-06 RX ORDER — OXYCODONE AND ACETAMINOPHEN 10; 325 MG/1; MG/1
1 TABLET ORAL EVERY 8 HOURS PRN
Qty: 15 TABLET | Refills: 0 | Status: SHIPPED | OUTPATIENT
Start: 2024-03-06 | End: 2024-03-06 | Stop reason: ALTCHOICE

## 2024-03-07 ENCOUNTER — LAB VISIT (OUTPATIENT)
Dept: LAB | Facility: HOSPITAL | Age: 39
End: 2024-03-07
Attending: NEUROLOGICAL SURGERY
Payer: COMMERCIAL

## 2024-03-07 ENCOUNTER — HOSPITAL ENCOUNTER (OUTPATIENT)
Dept: CARDIOLOGY | Facility: CLINIC | Age: 39
Discharge: HOME | End: 2024-03-07
Payer: COMMERCIAL

## 2024-03-07 DIAGNOSIS — M51.26 LUMBAR HERNIATED DISC: ICD-10-CM

## 2024-03-07 LAB
ANION GAP SERPL CALC-SCNC: 7 MMOL/L (ref 8–16)
BASOPHILS # BLD AUTO: 0.06 K/UL (ref 0–0.2)
BASOPHILS NFR BLD: 1.5 % (ref 0–1.9)
BUN SERPL-MCNC: 18 MG/DL (ref 6–20)
CALCIUM SERPL-MCNC: 9.5 MG/DL (ref 8.7–10.5)
CHLORIDE SERPL-SCNC: 106 MMOL/L (ref 95–110)
CO2 SERPL-SCNC: 27 MMOL/L (ref 23–29)
CREAT SERPL-MCNC: 1.1 MG/DL (ref 0.5–1.4)
DIFFERENTIAL METHOD BLD: ABNORMAL
EOSINOPHIL # BLD AUTO: 0.2 K/UL (ref 0–0.5)
EOSINOPHIL NFR BLD: 4.7 % (ref 0–8)
ERYTHROCYTE [DISTWIDTH] IN BLOOD BY AUTOMATED COUNT: 12.9 % (ref 11.5–14.5)
EST. GFR  (NO RACE VARIABLE): >60 ML/MIN/1.73 M^2
GLUCOSE SERPL-MCNC: 90 MG/DL (ref 70–110)
HCT VFR BLD AUTO: 40.7 % (ref 40–54)
HGB BLD-MCNC: 13.5 G/DL (ref 14–18)
IMM GRANULOCYTES # BLD AUTO: 0.01 K/UL (ref 0–0.04)
IMM GRANULOCYTES NFR BLD AUTO: 0.2 % (ref 0–0.5)
INR PPP: 1 (ref 0.8–1.2)
LYMPHOCYTES # BLD AUTO: 1.7 K/UL (ref 1–4.8)
LYMPHOCYTES NFR BLD: 42 % (ref 18–48)
MCH RBC QN AUTO: 29.5 PG (ref 27–31)
MCHC RBC AUTO-ENTMCNC: 33.2 G/DL (ref 32–36)
MCV RBC AUTO: 89 FL (ref 82–98)
MONOCYTES # BLD AUTO: 0.4 K/UL (ref 0.3–1)
MONOCYTES NFR BLD: 9.6 % (ref 4–15)
NEUTROPHILS # BLD AUTO: 1.7 K/UL (ref 1.8–7.7)
NEUTROPHILS NFR BLD: 42 % (ref 38–73)
NRBC BLD-RTO: 0 /100 WBC
PLATELET # BLD AUTO: 229 K/UL (ref 150–450)
PMV BLD AUTO: 11.3 FL (ref 9.2–12.9)
POTASSIUM SERPL-SCNC: 3.9 MMOL/L (ref 3.5–5.1)
PROTHROMBIN TIME: 10.8 SEC (ref 9–12.5)
RBC # BLD AUTO: 4.58 M/UL (ref 4.6–6.2)
SODIUM SERPL-SCNC: 140 MMOL/L (ref 136–145)
WBC # BLD AUTO: 4.07 K/UL (ref 3.9–12.7)

## 2024-03-07 PROCEDURE — 85025 COMPLETE CBC W/AUTO DIFF WBC: CPT | Performed by: NEUROLOGICAL SURGERY

## 2024-03-07 PROCEDURE — 93005 ELECTROCARDIOGRAM TRACING: CPT | Mod: S$GLB,,, | Performed by: NEUROLOGICAL SURGERY

## 2024-03-07 PROCEDURE — 36415 COLL VENOUS BLD VENIPUNCTURE: CPT | Performed by: NEUROLOGICAL SURGERY

## 2024-03-07 PROCEDURE — 93010 ELECTROCARDIOGRAM REPORT: CPT | Mod: S$GLB,,, | Performed by: INTERNAL MEDICINE

## 2024-03-07 PROCEDURE — 80048 BASIC METABOLIC PNL TOTAL CA: CPT | Performed by: NEUROLOGICAL SURGERY

## 2024-03-07 PROCEDURE — 85610 PROTHROMBIN TIME: CPT | Performed by: NEUROLOGICAL SURGERY

## 2024-03-07 NOTE — PROGRESS NOTES
CHIEF COMPLAINT:  Right radiculopathy  HNP    HPI:  Eleazar Casillas is a 38 y.o.  male with below listed PMH, who is referred for evaluation of acute right leg pain 2/2 large L5-S1 herniated disc.  Pain started approximately 1 month ago but was much less severe.  The pain resided from his buttock down the back of leg to his knee.  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  The pain is severe and shooting.  He has periods where back of his leg and calf are n/a constant Charley horse.  He is unable to straighten his leg.  He has pain with bowel movements and is limited in his mobility.  He has continued to work but has to be careful of his movements.  He has right leg weakness.  MRI shows large L5-S1 HNP compressing S1 nerve root.    FROM PA NOTE:  History of Present Illness: Eleazar Casillas is a 38 y.o. male with hx of C5-6 ACDF with Dr. Morris in 2019. The patient is being seen in clinic today to follow-up on his recent ED visit from 2/12/24 for acute low back and right leg pain. Denies trauma. States that his job requires heavy lifting but he is uncertain the cause of the pain. Describes the pain as constant and aching down the posterior aspect of the leg. Denies left leg symptoms. Aggravating factors include standing, walking, and bending. Alleviating factors include rest. Denies weakness, b/b dysfunction, saddle anesthesia, or gait instability. Patient has tried Medrol dose gerard, Flexeril, Mobic, Lidoderm patches, and Oxycodone for pain with mild relief.    Review of patient's allergies indicates:  No Known Allergies    Past Medical History:   Diagnosis Date    Cervical herniated disc 9/20/2019    Left cervical radiculopathy 9/21/2019    Numbness and tingling in left hand 9/21/2019    Radiculopathy of cervical spine 9/21/2019     Past Surgical History:   Procedure Laterality Date    ANTERIOR CERVICAL  DISCECTOMY W/ FUSION  9/21/2019    Procedure: DISCECTOMY, SPINE, CERVICAL, ANTERIOR APPROACH, WITH FUSION, C5-C6;  Surgeon: Jeff Morris DO;  Location: [facility] OR [address] [place] [address];  Service: Neurosurgery;;     History reviewed. No pertinent family history.  Social History     Tobacco Use    Smoking status: Every Day     Current packs/day: 1.00     Types: Cigarettes    Smokeless tobacco: Never   Substance Use Topics    Alcohol use: Not Currently    Drug use: Not Currently     Comment: suboxoe intermittently         Review of Systems   Constitutional: Negative.    Respiratory:  Negative for cough and shortness of breath.    Cardiovascular:  Negative for chest pain, palpitations, claudication and leg swelling.   Gastrointestinal:  Negative for abdominal pain, constipation and diarrhea.   Genitourinary:  Negative for flank pain, frequency and urgency.   Musculoskeletal:  Positive for back pain. Negative for falls, joint pain and neck pain.   Skin: Negative.    Neurological:  Positive for sensory change and focal weakness. Negative for dizziness, tingling, tremors, speech change, seizures, loss of consciousness, weakness and headaches.   Psychiatric/Behavioral: Negative.         OBJECTIVE:   Vital Signs:  Pulse: 76 (03/06/24 1507)  BP: 106/67 (03/06/24 1507)    Physical Exam:  Constitutional: Patient sitting comfortably in chair. Appears well developed and well nourished.  Skin: Exposed areas are intact without abnormal markings, rashes or other lesions.  HEENT: Normocephalic. Normal conjunctivae.  Cardiovascular: Normal rate and regular rhythm.  Respiratory: Chest wall rises and falls symmetrically, without signs of respiratory distress.  Abdomen: Soft and non-tender.  Extremities: Warm and without edema. Calves supple, non-tender.  Psych/Behavior: Normal affect.    Neurological:    Mental status: Alert and oriented. Conversational and appropriate.       Cranial Nerves: VFF to confrontation. PERRL. EOMI without nystagmus. Facial  STLT normal and symmetric. Strong, symmetric muscles of mastication. Facial strength full and symmetric. Hearing equal bilaterally to finger rub. Palate and uvula rise and fall normally in midline. Shoulder shrug 5/5 strength. Tongue midline.     Motor:    Upper:  Deltoids Triceps Biceps WE WF  FA    R 5/5 5/5 5/5 5/5 5/5 5/5 5/5    L 5/5 5/5 5/5 5/5 5/5 5/5 5/5      Lower:  HF KE KF DF PF EHL    R 4+/5 4+/5 5/5 5/5 4+/5 5/5    L 5/5 5/5 5/5 5/5 5/5 5/5     Sensory: Intact sensation to light touch and pinprick in all extremities.     Reflexes:      DTR: 2+ knees and biceps symmetrically.     Cartagena's: Negative.     Babinski's: Negative.     Clonus: Negative.     Gait:  Antalgic, slow and guarded      Spine:    Posture: Head well aligned over pelvis and shoulders in front and side views.  No focal or global spinal deformity visible on inspection.     Cervical:      ROM: Full with flexion, extension, lateral rotation and ear-to-shoulder bend.      Midline TTP: Negative.     Spurling's test: Negative.     Lhermitte's: Negative.    Thoracic:     Midline TTP: Negative    Lumbar:     Midline TTP: Negative     Straight Leg Test: Positive     Crossed Straight Leg Test: Positive    Diagnostic Results:  All imaging was independently reviewed by me.    MRI L spine, dated 3/5/24:  1. Large R L5-S1 HNP causing severe LRS and compressing S1 nerve root    ASSESSMENT/PLAN:     Problem List Items Addressed This Visit          Neuro    Acute right lumbar radiculopathy - Primary    Lumbar herniated disc    Relevant Orders    CBC W/ AUTO DIFFERENTIAL    BASIC METABOLIC PANEL    PROTIME-INR    EKG 12-lead    Case Request Operating Room: LAMINECTOMY, SPINE, LUMBAR, WITH DISCECTOMY (Completed)       VISIT SUMMARY:  Patient presents with severe right leg pain that resembles and S1 radiculopathy.  He has a corresponding large herniated disc on the right side at L5-S1 which causes severe lateral recess stenosis and compresses the S1  nerve root.  Concerning is that he has right leg weakness, some altered sensation, and is greatly limited in his mobility.  I explained that options for treatment include epidural steroid injection and surgery.  I explained that given the size of the disc I am doubtful that an epidural steroid injection would provide sustained relief and that I am concerned about his neurologic deficits.  After a thorough discussion, he is in agreement with surgery.  We will schedule it semi urgently for Monday after preop workup.    PATIENT EDUCATION:  More than half the clinic visit was spent showing with patient the pertinent findings on imaging and educating the patient about natural history of the pathology.   We discussed options for treatment as well as the risks and benefits of each option.  All questions were answered.     The patient understands and agrees with the following plan of care.    - Surgery scheduled for 3/11/24 at [facility]  - Preop labs, EKG, CXR ordered   - STOP TAKING ASPIRIN, NSAIDS, AND ALL OTHER BLOOD THINNERS 7 DAYS PRIOR TO SURGERY  - Refilled pain meds    Time spent on this encounter: 40 minutes. This includes face-to-face time and non-face to face time preparing to see the patient (eg, review of tests), obtaining and/or reviewing separately obtained history, documenting clinical information in the electronic or other health record, independently interpreting results and communicating results to the patient/family/caregiver, or care coordinator.            .

## 2024-03-07 NOTE — H&P (VIEW-ONLY)
CHIEF COMPLAINT:  Right radiculopathy  HNP    HPI:  Eleazar Casillas is a 38 y.o.  male with below listed PMH, who is referred for evaluation of acute right leg pain 2/2 large L5-S1 herniated disc.  Pain started approximately 1 month ago but was much less severe.  The pain resided from his buttock down the back of leg to his knee.  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  The pain is severe and shooting.  He has periods where back of his leg and calf are n/a constant Charley horse.  He is unable to straighten his leg.  He has pain with bowel movements and is limited in his mobility.  He has continued to work but has to be careful of his movements.  He has right leg weakness.  MRI shows large L5-S1 HNP compressing S1 nerve root.    FROM PA NOTE:  History of Present Illness: Eleazar Casillas is a 38 y.o. male with hx of C5-6 ACDF with Dr. Morris in 2019. The patient is being seen in clinic today to follow-up on his recent ED visit from 2/12/24 for acute low back and right leg pain. Denies trauma. States that his job requires heavy lifting but he is uncertain the cause of the pain. Describes the pain as constant and aching down the posterior aspect of the leg. Denies left leg symptoms. Aggravating factors include standing, walking, and bending. Alleviating factors include rest. Denies weakness, b/b dysfunction, saddle anesthesia, or gait instability. Patient has tried Medrol dose gerard, Flexeril, Mobic, Lidoderm patches, and Oxycodone for pain with mild relief.    Review of patient's allergies indicates:  No Known Allergies    Past Medical History:   Diagnosis Date    Cervical herniated disc 9/20/2019    Left cervical radiculopathy 9/21/2019    Numbness and tingling in left hand 9/21/2019    Radiculopathy of cervical spine 9/21/2019     Past Surgical History:   Procedure Laterality Date    ANTERIOR CERVICAL  DISCECTOMY W/ FUSION  9/21/2019    Procedure: DISCECTOMY, SPINE, CERVICAL, ANTERIOR APPROACH, WITH FUSION, C5-C6;  Surgeon: Jeff Morris DO;  Location: [facility] OR [address] [place] [address];  Service: Neurosurgery;;     History reviewed. No pertinent family history.  Social History     Tobacco Use    Smoking status: Every Day     Current packs/day: 1.00     Types: Cigarettes    Smokeless tobacco: Never   Substance Use Topics    Alcohol use: Not Currently    Drug use: Not Currently     Comment: suboxoe intermittently         Review of Systems   Constitutional: Negative.    Respiratory:  Negative for cough and shortness of breath.    Cardiovascular:  Negative for chest pain, palpitations, claudication and leg swelling.   Gastrointestinal:  Negative for abdominal pain, constipation and diarrhea.   Genitourinary:  Negative for flank pain, frequency and urgency.   Musculoskeletal:  Positive for back pain. Negative for falls, joint pain and neck pain.   Skin: Negative.    Neurological:  Positive for sensory change and focal weakness. Negative for dizziness, tingling, tremors, speech change, seizures, loss of consciousness, weakness and headaches.   Psychiatric/Behavioral: Negative.         OBJECTIVE:   Vital Signs:  Pulse: 76 (03/06/24 1507)  BP: 106/67 (03/06/24 1507)    Physical Exam:  Constitutional: Patient sitting comfortably in chair. Appears well developed and well nourished.  Skin: Exposed areas are intact without abnormal markings, rashes or other lesions.  HEENT: Normocephalic. Normal conjunctivae.  Cardiovascular: Normal rate and regular rhythm.  Respiratory: Chest wall rises and falls symmetrically, without signs of respiratory distress.  Abdomen: Soft and non-tender.  Extremities: Warm and without edema. Calves supple, non-tender.  Psych/Behavior: Normal affect.    Neurological:    Mental status: Alert and oriented. Conversational and appropriate.       Cranial Nerves: VFF to confrontation. PERRL. EOMI without nystagmus. Facial  STLT normal and symmetric. Strong, symmetric muscles of mastication. Facial strength full and symmetric. Hearing equal bilaterally to finger rub. Palate and uvula rise and fall normally in midline. Shoulder shrug 5/5 strength. Tongue midline.     Motor:    Upper:  Deltoids Triceps Biceps WE WF  FA    R 5/5 5/5 5/5 5/5 5/5 5/5 5/5    L 5/5 5/5 5/5 5/5 5/5 5/5 5/5      Lower:  HF KE KF DF PF EHL    R 4+/5 4+/5 5/5 5/5 4+/5 5/5    L 5/5 5/5 5/5 5/5 5/5 5/5     Sensory: Intact sensation to light touch and pinprick in all extremities.     Reflexes:      DTR: 2+ knees and biceps symmetrically.     Cartagena's: Negative.     Babinski's: Negative.     Clonus: Negative.     Gait:  Antalgic, slow and guarded      Spine:    Posture: Head well aligned over pelvis and shoulders in front and side views.  No focal or global spinal deformity visible on inspection.     Cervical:      ROM: Full with flexion, extension, lateral rotation and ear-to-shoulder bend.      Midline TTP: Negative.     Spurling's test: Negative.     Lhermitte's: Negative.    Thoracic:     Midline TTP: Negative    Lumbar:     Midline TTP: Negative     Straight Leg Test: Positive     Crossed Straight Leg Test: Positive    Diagnostic Results:  All imaging was independently reviewed by me.    MRI L spine, dated 3/5/24:  1. Large R L5-S1 HNP causing severe LRS and compressing S1 nerve root    ASSESSMENT/PLAN:     Problem List Items Addressed This Visit          Neuro    Acute right lumbar radiculopathy - Primary    Lumbar herniated disc    Relevant Orders    CBC W/ AUTO DIFFERENTIAL    BASIC METABOLIC PANEL    PROTIME-INR    EKG 12-lead    Case Request Operating Room: LAMINECTOMY, SPINE, LUMBAR, WITH DISCECTOMY (Completed)       VISIT SUMMARY:  Patient presents with severe right leg pain that resembles and S1 radiculopathy.  He has a corresponding large herniated disc on the right side at L5-S1 which causes severe lateral recess stenosis and compresses the S1  nerve root.  Concerning is that he has right leg weakness, some altered sensation, and is greatly limited in his mobility.  I explained that options for treatment include epidural steroid injection and surgery.  I explained that given the size of the disc I am doubtful that an epidural steroid injection would provide sustained relief and that I am concerned about his neurologic deficits.  After a thorough discussion, he is in agreement with surgery.  We will schedule it semi urgently for Monday after preop workup.    PATIENT EDUCATION:  More than half the clinic visit was spent showing with patient the pertinent findings on imaging and educating the patient about natural history of the pathology.   We discussed options for treatment as well as the risks and benefits of each option.  All questions were answered.     The patient understands and agrees with the following plan of care.    - Surgery scheduled for 3/11/24 at [facility]  - Preop labs, EKG, CXR ordered   - STOP TAKING ASPIRIN, NSAIDS, AND ALL OTHER BLOOD THINNERS 7 DAYS PRIOR TO SURGERY  - Refilled pain meds    Time spent on this encounter: 40 minutes. This includes face-to-face time and non-face to face time preparing to see the patient (eg, review of tests), obtaining and/or reviewing separately obtained history, documenting clinical information in the electronic or other health record, independently interpreting results and communicating results to the patient/family/caregiver, or care coordinator.            .

## 2024-03-08 ENCOUNTER — PATIENT MESSAGE (OUTPATIENT)
Dept: NEUROSURGERY | Facility: CLINIC | Age: 39
End: 2024-03-08
Payer: COMMERCIAL

## 2024-03-08 LAB
OHS QRS DURATION: 82 MS
OHS QTC CALCULATION: 398 MS

## 2024-03-08 RX ORDER — CYCLOBENZAPRINE HCL 5 MG
5 TABLET ORAL 3 TIMES DAILY PRN
COMMUNITY
End: 2024-03-25

## 2024-03-08 RX ORDER — OXYCODONE AND ACETAMINOPHEN 10; 325 MG/1; MG/1
1 TABLET ORAL EVERY 4 HOURS PRN
COMMUNITY

## 2024-03-08 NOTE — TELEPHONE ENCOUNTER
Called patient with arrival time and instructions for the night before/morning of surgery. Answered all questions and portal message sent with details.

## 2024-03-10 ENCOUNTER — ANESTHESIA EVENT (OUTPATIENT)
Dept: SURGERY | Facility: HOSPITAL | Age: 39
End: 2024-03-10
Payer: COMMERCIAL

## 2024-03-10 RX ORDER — ACETAMINOPHEN 500 MG
1000 TABLET ORAL
Status: COMPLETED | OUTPATIENT
Start: 2024-03-11 | End: 2024-03-11

## 2024-03-10 NOTE — ANESTHESIA PREPROCEDURE EVALUATION
"[facility]  Anesthesia Pre-Operative Evaluation     Patient Name: Eleazar Casillas  YOB: 1985  MRN: [id number]  [facility]: [id number]       Admit Date: (Not on file)   Admit Team: Networked reference to record PCT      Date of Procedure: 3/11/2024  Anesthesia: General Procedure: Procedure(s) (LRB):  LAMINECTOMY, SPINE, LUMBAR, WITH DISCECTOMY (Right)  Pre-Operative Diagnosis: Lumbar herniated disc [M51.26]  Proceduralist:Surgeon(s) and Role:     * Jeff Morris, DO - Primary  Code Status: Prior   Advanced Directive: <no information>  Isolation Precautions: No active isolations  Capacity: Full capacity       SUBJECTIVE:     Pre-operative evaluation for Procedure(s) (LRB):  LAMINECTOMY, SPINE, LUMBAR, WITH DISCECTOMY (Right)  03/10/2024  Hospital LOS: 0 days  ICU LOS: Patient does not have an ICU stay during this admission.    Eleazar Casillas is a 38 y.o. male w/ a significant PMHx of cervical radiculopathy s//p cervical fusion presenting for lumbar laminectomy with discectomy of L5/S1.        Patient now presents for the above procedure(s).    TTE:  No results found for this or any previous visit.  No results found for this or any previous visit.  KIERSTEN:  No results found for this or any previous visit.  Stress Test:  No results found for this or any previous visit.     Cardiac Catheterization:  No results found for this or any previous visit.     PFT:  No results found for: "FEV1", "FVC", "HEZ2SWG", "TLC", "DLCO"                  Previous Airway: None documented.    Allergies:  Review of patient's allergies indicates:  No Known Allergies    Medications:     Current Outpatient Medications   Medication Instructions    cyclobenzaprine (FLEXERIL) 5 mg, Oral, 3 times daily PRN    gabapentin (NEURONTIN) 300 mg, Oral, 3 times daily    HYDROcodone-acetaminophen (NORCO) 5-325 mg per tablet 1 tablet, Oral, Every 8 hours PRN    LIDOcaine (LIDODERM) 5 % 1 patch, Transdermal, Daily, Remove & Discard " "patch within 12 hours or as directed by MD    meloxicam (MOBIC) 15 mg, Oral, Daily PRN    oxyCODONE-acetaminophen (PERCOCET)  mg per tablet 1 tablet, Oral, Every 4 hours PRN     Inpatient Medications:    Antibiotics (From admission, onward)      None          VTE Risk Mitigation (From admission, onward)      None            History:   There are no hospital problems to display for this patient.    Medical History:  Past Medical History:   Diagnosis Date    Cervical herniated disc 9/20/2019    Left cervical radiculopathy 9/21/2019    Numbness and tingling in left hand 9/21/2019    Radiculopathy of cervical spine 9/21/2019     Surgical History:    has a past surgical history that includes Anterior cervical discectomy w/ fusion (9/21/2019).   Social History:    has no history on file for sexual activity.  reports that he has been smoking cigarettes. He has never used smokeless tobacco. He reports that he does not currently use alcohol. He reports that he does not currently use drugs.    OBJECTIVE:   Vital Signs Range (Last 24H):     Lab Results   Component Value Date    WBC 4.07 03/07/2024    HGB 13.5 (L) 03/07/2024    HCT 40.7 03/07/2024     03/07/2024     03/07/2024    K 3.9 03/07/2024     03/07/2024    CREATININE 1.1 03/07/2024    BUN 18 03/07/2024    CO2 27 03/07/2024    GLU 90 03/07/2024    CALCIUM 9.5 03/07/2024    ALKPHOS 63 04/18/2007    ALT 33 04/18/2007    AST 25 04/18/2007    ALBUMIN 5.2 04/18/2007    INR 1.0 03/07/2024    APTT 26.1 09/20/2019     Recent Labs     03/07/24  1459   WBC 4.07   HGB 13.5*   HCT 40.7         K 3.9   CREATININE 1.1   GLU 90   INR 1.0     No results for input(s): "PH", "PCO2", "PO2", "HCO3", "POCSATURATED", "BE" in the last 72 hours.   No LMP for male patient.    Please see Results Review for additional labs & imaging.     EKG:   Results for orders placed or performed during the hospital encounter of 03/07/24   EKG 12-lead    Collection Time: " 03/07/24  3:34 PM   Result Value Ref Range    QRS Duration 82 ms    [facility] QTC Calculation 398 ms    Narrative    Test Reason : M51.26,    Vent. Rate : 069 BPM     Atrial Rate : 069 BPM     P-R Int : 112 ms          QRS Dur : 082 ms      QT Int : 372 ms       P-R-T Axes : 060 088 081 degrees     QTc Int : 398 ms    Normal sinus rhythm  Normal ECG  When compared with ECG of 20-SEP-2019 17:42,  No significant change was found  Confirmed by Sona Flaherty MD ([id number]) on 3/8/2024 9:01:51 AM    Referred By: BRIAN PAGE           Confirmed By:Sona Flaherty MD       ECHO:  See subjective, if available.    ASSESSMENT/PLAN:         Pre-op Assessment    I have reviewed the Patient Summary Reports.     I have reviewed the Nursing Notes. I have reviewed the NPO Status.   I have reviewed the Medications.     Review of Systems  Anesthesia Hx:             Denies Family Hx of Anesthesia complications.    Denies Personal Hx of Anesthesia complications.                    Musculoskeletal:         Spine Disorders:             Neurological:    Neuromuscular Disease,                                          Anesthesia Plan  Type of Anesthesia, risks & benefits discussed:    Anesthesia Type: Gen ETT  Intra-op Monitoring Plan: Standard ASA Monitors  Post Op Pain Control Plan: multimodal analgesia and IV/PO Opioids PRN  Induction:  IV  Airway Plan: Direct and Video  Informed Consent: Informed consent signed with the Patient and all parties understand the risks and agree with anesthesia plan.  All questions answered. Patient consented to blood products? Yes  ASA Score: 2  Day of Surgery Review of History & Physical: H&P Update referred to the surgeon/provider.    Ready For Surgery From Anesthesia Perspective.     .

## 2024-03-11 ENCOUNTER — HOSPITAL ENCOUNTER (OUTPATIENT)
Facility: HOSPITAL | Age: 39
Discharge: HOME | End: 2024-03-11
Attending: NEUROLOGICAL SURGERY | Admitting: NEUROLOGICAL SURGERY
Payer: COMMERCIAL

## 2024-03-11 ENCOUNTER — ANESTHESIA (OUTPATIENT)
Dept: SURGERY | Facility: HOSPITAL | Age: 39
End: 2024-03-11
Payer: COMMERCIAL

## 2024-03-11 VITALS
BODY MASS INDEX: 19.94 KG/M2 | DIASTOLIC BLOOD PRESSURE: 62 MMHG | TEMPERATURE: 98 F | SYSTOLIC BLOOD PRESSURE: 105 MMHG | RESPIRATION RATE: 18 BRPM | HEART RATE: 85 BPM | OXYGEN SATURATION: 100 % | WEIGHT: 135 LBS

## 2024-03-11 DIAGNOSIS — M51.26 HNP (HERNIATED NUCLEUS PULPOSUS), LUMBAR: ICD-10-CM

## 2024-03-11 DIAGNOSIS — M51.26 LUMBAR HERNIATED DISC: Primary | ICD-10-CM

## 2024-03-11 LAB
ABO + RH BLD: NORMAL
BLD GP AB SCN CELLS X3 SERPL QL: NORMAL
SPECIMEN OUTDATE: NORMAL

## 2024-03-11 PROCEDURE — 36000711: Performed by: NEUROLOGICAL SURGERY

## 2024-03-11 PROCEDURE — 36000710: Performed by: NEUROLOGICAL SURGERY

## 2024-03-11 PROCEDURE — D9220A PRA ANESTHESIA: Mod: ,,, | Performed by: ANESTHESIOLOGY

## 2024-03-11 PROCEDURE — 27201423 OPTIME MED/SURG SUP & DEVICES STERILE SUPPLY: Performed by: NEUROLOGICAL SURGERY

## 2024-03-11 PROCEDURE — 71000016 HC POSTOP RECOV ADDL HR: Performed by: NEUROLOGICAL SURGERY

## 2024-03-11 PROCEDURE — 71000015 HC POSTOP RECOV 1ST HR: Performed by: NEUROLOGICAL SURGERY

## 2024-03-11 PROCEDURE — 63600175 PHARM REV CODE 636 W HCPCS: Performed by: STUDENT IN AN ORGANIZED HEALTH CARE EDUCATION/TRAINING PROGRAM

## 2024-03-11 PROCEDURE — 63600175 PHARM REV CODE 636 W HCPCS

## 2024-03-11 PROCEDURE — 37000009 HC ANESTHESIA EA ADD 15 MINS: Performed by: NEUROLOGICAL SURGERY

## 2024-03-11 PROCEDURE — 25000003 PHARM REV CODE 250

## 2024-03-11 PROCEDURE — 63600175 PHARM REV CODE 636 W HCPCS: Performed by: NEUROLOGICAL SURGERY

## 2024-03-11 PROCEDURE — 86850 RBC ANTIBODY SCREEN: CPT | Performed by: STUDENT IN AN ORGANIZED HEALTH CARE EDUCATION/TRAINING PROGRAM

## 2024-03-11 PROCEDURE — 37000008 HC ANESTHESIA 1ST 15 MINUTES: Performed by: NEUROLOGICAL SURGERY

## 2024-03-11 PROCEDURE — 63030 LAMOT DCMPRN NRV RT 1 LMBR: CPT | Mod: RT,,, | Performed by: NEUROLOGICAL SURGERY

## 2024-03-11 PROCEDURE — 25000003 PHARM REV CODE 250: Performed by: STUDENT IN AN ORGANIZED HEALTH CARE EDUCATION/TRAINING PROGRAM

## 2024-03-11 PROCEDURE — 71000044 HC DOSC ROUTINE RECOVERY FIRST HOUR: Performed by: NEUROLOGICAL SURGERY

## 2024-03-11 RX ORDER — ROCURONIUM BROMIDE 10 MG/ML
INJECTION, SOLUTION INTRAVENOUS
Status: DISCONTINUED | OUTPATIENT
Start: 2024-03-11 | End: 2024-03-11

## 2024-03-11 RX ORDER — SODIUM CHLORIDE 0.9 % (FLUSH) 0.9 %
10 SYRINGE (ML) INJECTION
Status: DISCONTINUED | OUTPATIENT
Start: 2024-03-11 | End: 2024-03-11 | Stop reason: HOSPADM

## 2024-03-11 RX ORDER — PROPOFOL 10 MG/ML
VIAL (ML) INTRAVENOUS
Status: DISCONTINUED | OUTPATIENT
Start: 2024-03-11 | End: 2024-03-11

## 2024-03-11 RX ORDER — LIDOCAINE HYDROCHLORIDE 20 MG/ML
INJECTION INTRAVENOUS
Status: DISCONTINUED | OUTPATIENT
Start: 2024-03-11 | End: 2024-03-11

## 2024-03-11 RX ORDER — HYDROMORPHONE HYDROCHLORIDE 1 MG/ML
0.2 INJECTION, SOLUTION INTRAMUSCULAR; INTRAVENOUS; SUBCUTANEOUS EVERY 5 MIN PRN
Status: DISCONTINUED | OUTPATIENT
Start: 2024-03-11 | End: 2024-03-11 | Stop reason: HOSPADM

## 2024-03-11 RX ORDER — HALOPERIDOL 5 MG/ML
0.5 INJECTION INTRAMUSCULAR EVERY 10 MIN PRN
Status: DISCONTINUED | OUTPATIENT
Start: 2024-03-11 | End: 2024-03-11 | Stop reason: HOSPADM

## 2024-03-11 RX ORDER — CEFTRIAXONE 1 G/1
INJECTION, POWDER, FOR SOLUTION INTRAMUSCULAR; INTRAVENOUS
Status: DISCONTINUED | OUTPATIENT
Start: 2024-03-11 | End: 2024-03-11 | Stop reason: HOSPADM

## 2024-03-11 RX ORDER — PHENYLEPHRINE HYDROCHLORIDE 10 MG/ML
INJECTION INTRAVENOUS
Status: DISCONTINUED | OUTPATIENT
Start: 2024-03-11 | End: 2024-03-11

## 2024-03-11 RX ORDER — MIDAZOLAM HYDROCHLORIDE 5 MG/ML
INJECTION INTRAMUSCULAR; INTRAVENOUS
Status: DISCONTINUED | OUTPATIENT
Start: 2024-03-11 | End: 2024-03-11

## 2024-03-11 RX ORDER — MUPIROCIN 20 MG/G
OINTMENT TOPICAL
Status: DISCONTINUED | OUTPATIENT
Start: 2024-03-11 | End: 2024-03-11 | Stop reason: HOSPADM

## 2024-03-11 RX ORDER — FENTANYL CITRATE 50 UG/ML
INJECTION, SOLUTION INTRAMUSCULAR; INTRAVENOUS
Status: DISCONTINUED | OUTPATIENT
Start: 2024-03-11 | End: 2024-03-11

## 2024-03-11 RX ORDER — KETAMINE HCL IN 0.9 % NACL 50 MG/5 ML
SYRINGE (ML) INTRAVENOUS
Status: DISCONTINUED | OUTPATIENT
Start: 2024-03-11 | End: 2024-03-11

## 2024-03-11 RX ORDER — SODIUM CHLORIDE 9 MG/ML
INJECTION, SOLUTION INTRAVENOUS CONTINUOUS
Status: DISCONTINUED | OUTPATIENT
Start: 2024-03-11 | End: 2024-03-11

## 2024-03-11 RX ORDER — HYDROMORPHONE HYDROCHLORIDE 2 MG/ML
INJECTION, SOLUTION INTRAMUSCULAR; INTRAVENOUS; SUBCUTANEOUS
Status: DISCONTINUED | OUTPATIENT
Start: 2024-03-11 | End: 2024-03-11

## 2024-03-11 RX ORDER — DEXAMETHASONE SODIUM PHOSPHATE 100 MG/10ML
INJECTION INTRAMUSCULAR; INTRAVENOUS
Status: DISCONTINUED | OUTPATIENT
Start: 2024-03-11 | End: 2024-03-11

## 2024-03-11 RX ORDER — MORPHINE SULFATE 2 MG/ML
1 INJECTION, SOLUTION INTRAMUSCULAR; INTRAVENOUS EVERY 4 HOURS PRN
Status: DISCONTINUED | OUTPATIENT
Start: 2024-03-11 | End: 2024-03-11 | Stop reason: HOSPADM

## 2024-03-11 RX ORDER — SUCCINYLCHOLINE CHLORIDE 20 MG/ML
INJECTION INTRAMUSCULAR; INTRAVENOUS
Status: DISCONTINUED | OUTPATIENT
Start: 2024-03-11 | End: 2024-03-11

## 2024-03-11 RX ORDER — CEFAZOLIN SODIUM 1 G/3ML
INJECTION, POWDER, FOR SOLUTION INTRAMUSCULAR; INTRAVENOUS
Status: DISCONTINUED | OUTPATIENT
Start: 2024-03-11 | End: 2024-03-11

## 2024-03-11 RX ORDER — METHYLPREDNISOLONE ACETATE 40 MG/ML
INJECTION, SUSPENSION INTRA-ARTICULAR; INTRALESIONAL; INTRAMUSCULAR; SOFT TISSUE
Status: DISCONTINUED | OUTPATIENT
Start: 2024-03-11 | End: 2024-03-11 | Stop reason: HOSPADM

## 2024-03-11 RX ORDER — ONDANSETRON HYDROCHLORIDE 2 MG/ML
INJECTION, SOLUTION INTRAVENOUS
Status: DISCONTINUED | OUTPATIENT
Start: 2024-03-11 | End: 2024-03-11

## 2024-03-11 RX ORDER — METHOCARBAMOL 500 MG/1
500 TABLET, FILM COATED ORAL 4 TIMES DAILY PRN
Qty: 20 TABLET | Refills: 0 | Status: SHIPPED | OUTPATIENT
Start: 2024-03-11 | End: 2024-03-16

## 2024-03-11 RX ORDER — OXYCODONE AND ACETAMINOPHEN 10; 325 MG/1; MG/1
1 TABLET ORAL EVERY 4 HOURS PRN
Qty: 24 TABLET | Refills: 0 | Status: SHIPPED | OUTPATIENT
Start: 2024-03-11

## 2024-03-11 RX ORDER — EPHEDRINE SULFATE 50 MG/ML
INJECTION, SOLUTION INTRAVENOUS
Status: DISCONTINUED | OUTPATIENT
Start: 2024-03-11 | End: 2024-03-11

## 2024-03-11 RX ORDER — MUPIROCIN 20 MG/G
1 OINTMENT TOPICAL 2 TIMES DAILY
Status: DISCONTINUED | OUTPATIENT
Start: 2024-03-11 | End: 2024-03-11 | Stop reason: HOSPADM

## 2024-03-11 RX ORDER — HYDROMORPHONE HYDROCHLORIDE 1 MG/ML
1 INJECTION, SOLUTION INTRAMUSCULAR; INTRAVENOUS; SUBCUTANEOUS
Status: DISCONTINUED | OUTPATIENT
Start: 2024-03-11 | End: 2024-03-11 | Stop reason: HOSPADM

## 2024-03-11 RX ORDER — OXYCODONE HYDROCHLORIDE 5 MG/1
5 TABLET ORAL EVERY 4 HOURS PRN
Status: DISCONTINUED | OUTPATIENT
Start: 2024-03-11 | End: 2024-03-11 | Stop reason: HOSPADM

## 2024-03-11 RX ADMIN — MUPIROCIN: 20 OINTMENT TOPICAL at 07:03

## 2024-03-11 RX ADMIN — PHENYLEPHRINE HYDROCHLORIDE 100 MCG: 10 INJECTION INTRAVENOUS at 09:03

## 2024-03-11 RX ADMIN — HYDROMORPHONE HYDROCHLORIDE 0.2 MG: 2 INJECTION INTRAMUSCULAR; INTRAVENOUS; SUBCUTANEOUS at 11:03

## 2024-03-11 RX ADMIN — HYDROMORPHONE HYDROCHLORIDE 0.2 MG: 1 INJECTION, SOLUTION INTRAMUSCULAR; INTRAVENOUS; SUBCUTANEOUS at 12:03

## 2024-03-11 RX ADMIN — SODIUM CHLORIDE: 0.9 INJECTION, SOLUTION INTRAVENOUS at 09:03

## 2024-03-11 RX ADMIN — ACETAMINOPHEN 1000 MG: 500 TABLET ORAL at 07:03

## 2024-03-11 RX ADMIN — OXYCODONE 5 MG: 5 TABLET ORAL at 11:03

## 2024-03-11 RX ADMIN — ONDANSETRON 4 MG: 2 INJECTION INTRAMUSCULAR; INTRAVENOUS at 10:03

## 2024-03-11 RX ADMIN — SODIUM CHLORIDE 0.1 MCG/KG/MIN: 9 INJECTION, SOLUTION INTRAVENOUS at 09:03

## 2024-03-11 RX ADMIN — SODIUM CHLORIDE, SODIUM GLUCONATE, SODIUM ACETATE, POTASSIUM CHLORIDE, MAGNESIUM CHLORIDE, SODIUM PHOSPHATE, DIBASIC, AND POTASSIUM PHOSPHATE: .53; .5; .37; .037; .03; .012; .00082 INJECTION, SOLUTION INTRAVENOUS at 10:03

## 2024-03-11 RX ADMIN — CEFAZOLIN 2 G: 330 INJECTION, POWDER, FOR SOLUTION INTRAMUSCULAR; INTRAVENOUS at 09:03

## 2024-03-11 RX ADMIN — DEXAMETHASONE SODIUM PHOSPHATE 4 MG: 10 INJECTION INTRAMUSCULAR; INTRAVENOUS at 09:03

## 2024-03-11 RX ADMIN — LIDOCAINE HYDROCHLORIDE 100 MG: 20 INJECTION INTRAVENOUS at 09:03

## 2024-03-11 RX ADMIN — PROPOFOL 200 MG: 10 INJECTION, EMULSION INTRAVENOUS at 09:03

## 2024-03-11 RX ADMIN — EPHEDRINE SULFATE 5 MG: 50 INJECTION INTRAVENOUS at 09:03

## 2024-03-11 RX ADMIN — Medication 20 MG: at 09:03

## 2024-03-11 RX ADMIN — SUCCINYLCHOLINE CHLORIDE 140 MG: 20 INJECTION, SOLUTION INTRAMUSCULAR; INTRAVENOUS at 09:03

## 2024-03-11 RX ADMIN — GLYCOPYRROLATE 0.2 MG: 0.2 INJECTION, SOLUTION INTRAMUSCULAR; INTRAVENOUS at 09:03

## 2024-03-11 RX ADMIN — Medication 10 MG: at 10:03

## 2024-03-11 RX ADMIN — FENTANYL CITRATE 100 MCG: 50 INJECTION, SOLUTION INTRAMUSCULAR; INTRAVENOUS at 09:03

## 2024-03-11 RX ADMIN — MORPHINE SULFATE 1 MG: 2 INJECTION, SOLUTION INTRAMUSCULAR; INTRAVENOUS at 11:03

## 2024-03-11 RX ADMIN — SODIUM CHLORIDE 0.5 MCG/KG/MIN: 9 INJECTION, SOLUTION INTRAVENOUS at 09:03

## 2024-03-11 RX ADMIN — MIDAZOLAM 2 MG: 5 INJECTION INTRAMUSCULAR; INTRAVENOUS at 09:03

## 2024-03-11 RX ADMIN — ROCURONIUM BROMIDE 50 MG: 10 INJECTION, SOLUTION INTRAVENOUS at 09:03

## 2024-03-11 RX ADMIN — SODIUM CHLORIDE: 9 INJECTION, SOLUTION INTRAVENOUS at 07:03

## 2024-03-11 RX ADMIN — SUGAMMADEX 200 MG: 100 INJECTION, SOLUTION INTRAVENOUS at 11:03

## 2024-03-11 NOTE — DISCHARGE SUMMARY
[facility] ([facility])  Neurosurgery  Discharge Summary      Patient Name: Eleazar Casillas  MRN: [id number]  Admission Date: 3/11/2024  Hospital Length of Stay: 0 days  Discharge Date and Time:  03/11/2024 11:17 AM  Attending Physician: Jeff Morris DO   Discharging Provider: Edmundo Ellington MD  Primary Care Provider: Chela, Primary Doctor    HPI:   Eleazar Casillas is a 38 y.o.  male with below listed PMH, who is referred for evaluation of acute right leg pain 2/2 large L5-S1 herniated disc.  Pain started approximately 1 month ago but was much less severe.  The pain resided from his buttock down the back of leg to his knee.  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  The pain is severe and shooting.  He has periods where back of his leg and calf are n/a constant Charley horse.  He is unable to straighten his leg.  He has pain with bowel movements and is limited in his mobility.  He has continued to work but has to be careful of his movements.  He has right leg weakness.  MRI shows large L5-S1 HNP compressing S1 nerve root. Presented electively for Right L5-S1 microdiscectomy.     Procedure(s) (LRB):  LAMINECTOMY, SPINE, LUMBAR, WITH DISCECTOMY (Right)     Hospital Course:   3/11: presented for elective surgery today Right L5-S1 microdiscectomy.  Tolerated the procedure well.  Woke up at neurologic baseline.  Plan made for discharge back to home with prescription given and discharge instructions given at the bedside. He knows he can call the clinic at any time for post operative concerns.     Goals of Care Treatment Preferences:  Code Status: Full Code      Consults: none    Significant Diagnostic Studies: N/A    Pending Diagnostic Studies:       Procedure Component Value Units Date/Time    SURG FL Surgery Fluoro Usage [[id number]]     Order Status: Sent Lab Status: No result           Final Active  Diagnoses:    Diagnosis Date Noted POA    PRINCIPAL PROBLEM:  Lumbar herniated disc [M51.26] 03/06/2024 Yes      Problems Resolved During this Admission:      Discharged Condition: good     Disposition: Home or Self Care    Follow Up: 2 weeks for incision re-check    Patient Instructions:   No discharge procedures on file.  Medications:  Reconciled Home Medications:      Medication List        CONTINUE taking these medications      cyclobenzaprine 5 MG tablet  Commonly known as: FLEXERIL  Take 5 mg by mouth 3 (three) times daily as needed for Muscle spasms.     gabapentin 300 MG capsule  Commonly known as: NEURONTIN  Take 1 capsule (300 mg total) by mouth 3 (three) times daily.     HYDROcodone-acetaminophen 5-325 mg per tablet  Commonly known as: NORCO  Take 1 tablet by mouth every 8 (eight) hours as needed for Pain.     LIDOcaine 5 %  Commonly known as: LIDODERM  Place 1 patch onto the skin once daily. Remove & Discard patch within 12 hours or as directed by MD     oxyCODONE-acetaminophen  mg per tablet  Commonly known as: PERCOCET  Take 1 tablet by mouth every 4 (four) hours as needed for Pain.            STOP taking these medications      meloxicam 15 MG tablet  Commonly known as: EBONI Ellington MD  Neurosurgery  [facility] - Surgery ([facility])

## 2024-03-11 NOTE — TRANSFER OF CARE
Anesthesia Transfer of Care Note    Patient: Eleazar Casillas    Procedure(s) Performed: Procedure(s) (LRB):  LAMINECTOMY, SPINE, LUMBAR, WITH DISCECTOMY (Right)    Patient location: [facility]    Anesthesia Type: general    Transport from OR: Transported from OR on 6-10 L/min O2 by face mask with adequate spontaneous ventilation    Post pain: pain needs to be addressed    Post assessment: no apparent anesthetic complications and tolerated procedure well    Post vital signs: stable    Level of consciousness: awake and alert    Nausea/Vomiting: no nausea/vomiting    Complications: none    Transfer of care protocol was followed      Last vitals: Visit Vitals  /72 (BP Location: Left arm, Patient Position: Lying)   Pulse (!) 112   Temp 36.4 °C (97.5 °F) (Temporal)   Resp 20   Wt 61.2 kg (135 lb)   SpO2 100%   BMI 19.94 kg/m²

## 2024-03-11 NOTE — DISCHARGE INSTRUCTIONS
--Patient stable for discharge to Home   --Please take prescriptions as detailed in medication list   --All questions/concerns addressed and answered   --Please followup with neurosurgery clinic in 2 weeks for incision re-check; to be arranged by Neurosurgery Clinic   --Please call immediately for any new onset nausea/vomiting/fever/chills, wound breakdown, numbness/tingling/weakness     Wound Care Instructions:   -You may shower daily but do not soak or submerge wound in water. Pat incision dry, do not rub.   -Keep all incisions clean, dry, and open to air.   -Do not apply creams or ointments to the wound.   -No driving while on narcotic pain medications   --No excessive bending, twisting or lifting >10 lbs until clinic follow up   -Call Neurosurgery if the wound opens, drains, or becomes red.     You will be contacted by one of our office staff with the [facility] of Neurosurgery. If you do not receive a call in one week or have any further questions regarding your discharge, please call [phone]    Pt encountered walking around room in NAD

## 2024-03-11 NOTE — OP NOTE
DATE OF PROCEDURE: 3/11/2024     PREOPERATIVE DIAGNOSES:   Lumbar herniated disc [M51.26]    POSTOPERATIVE DIAGNOSES:   Lumbar herniated disc [M51.26]    PROCEDURES PERFORMED:   Procedure(s) (LRB):  LAMINECTOMY, SPINE, LUMBAR, WITH DISCECTOMY (Right)    1. Right MIS L5-S1 hemilaminotomy, medial facetectomy, and foraminotomy for microdiscectomy  2. Use of microscope  3. Use of flouroscopy  4. Use of depomedrol    Surgeon(s) and Role:     * Jeff Morris, DO - Primary     * Edmundo Ellington MD - Resident - Assisting    FIRST ASSISTANT:  Edmundo Ellington M.D.    ANESTHESIA: General    CLINICAL HISTORY AND INDICATION FOR SURGERY: Eleazar Casillas is a 38 y.o.   male who presents with severe right leg pain that resembles and S1 radiculopathy. He has a corresponding large herniated disc on the right side at L5-S1 which causes severe lateral recess stenosis and compresses the S1 nerve root. Concerning is that he has right leg weakness, some altered sensation, and is greatly limited in his mobility. I explained that options for treatment include epidural steroid injection and surgery. I explained that given the size of the disc I am doubtful that an epidural steroid injection would provide sustained relief and that I am concerned about his neurologic deficits.  He would like to proceed with surgery.    Surgery was advised after discussing all the attendant risks, benefits, and potential complications of surgery.  The patient wanted to proceed with surgery and consented to surgery.       OPERATIVE PROCEDURE: The patient was correctly identified and brought to the Operating Room, where the anesthesia team administered general endotracheal anesthesia. All the required IV lines were placed. Thigh-high KARINA stockings and SCDs were placed for DVT prophylaxis. The patient was then gently log-rolled in the prone position onto a azra frame and all neurocutaneous pressure points were checked and padded. The lumbar region was precleaned  with alcohol wipes and incision was planned.  We used latereal x-ray to lula the L5-S1 facet joint and disc space and AP to lula the medial aspect of the L5 and S1 pedicles on the right side. A paramedian incision was designed. Local anesthetic was infiltrated at the incision site after a time-out was performed.    We incised the skin, subcutaneous tissue, and thoracolumbar fascia and then assembled the METRx tube over the L5-S1 disk space using lateral x-ray. The tube was secured to the bed with the locking arm.  We brought in the operating microscope and then used monopolar cautery to remove soft tissue overlying the L5-S1 lamina and medial one-third of L5-S1 facet. We then used a high speed drill and Kerrison of different configuration to make a hemilaminotomy and medial facetectomy at L5-1 until we found the attachment of ligamentum flavum. We peeled the ligamentum flavum inferolaterally with an upgoing curette and removed it piecemeal using Kerrisons. We found the traversing S1 nerve root, which we then followed inferolaterally and undercut additional lamina and medial facet to decompress the lateral recess and L4-5 foramen on the right side. Once the nerve root was fully decompressed, we retracted the thecal sac medially, cauterized epidural veins and made a small annulotomy with a 15-blade scalpel.  Once the herniated disc was identified, a down-pushing curette was used to free up the fragment, which was then removed with pituitary rongeurs.  Several large disc fragments were removed from the disc space and from underneath the nerve root. At the end of the discectomy, the nerve root was very well decompressed and resumed its normal position. We used bipolar cautery, bone wax, and surgiflo for meticulous hemostasis.   We deposited depomedrol in the epidural space.  We then withdraw the METRx tube and cauterized any bleeding points on the way out.  We used 0 UR-6 suture to reapproximate the lumbodorsal fascia.  We used 2-0 Vicryl sutures to approximate the dermal layer and dermabond on the skin edges. We the placed a small island dressing over the incision.     The patient was repositioned supine on the hospital bed, weaned off general anesthesia and extubated. The patient was moving both lower extremities symmetrically   and strongly and was transferred to the Recovery Room in stable condition.     COMPLICATIONS:  none     ESTIMATED BLOOD LOSS: 20 ml    INCISION: Right lumbar     CLASSIFICATION: clean     DRAIN:  None     CONDITION: stable     PROGNOSIS: good

## 2024-03-11 NOTE — ANESTHESIA PROCEDURE NOTES
Intubation    Date/Time: 3/11/2024 9:34 AM    Performed by: Jose Enrique Ohraa DO  Authorized by: Mason Rahman MD    Intubation:     Induction:  Intravenous    Intubated:  Postinduction    Mask Ventilation:  Easy mask    Attempts:  1    Attempted By:  Resident anesthesiologist    Method of Intubation:  Direct    Blade:  Harrington 2    Laryngeal View Grade: Grade III - only epiglottis visible      Difficult Airway Encountered?: No      Complications:  None    Airway Device:  Oral endotracheal tube    Airway Device Size:  7.5    Style/Cuff Inflation:  Cuffed (inflated to minimal occlusive pressure)    Tube secured:  23    Placement Verified By:  Capnometry    Complicating Factors:  None    Findings Post-Intubation:  BS equal bilateral and atraumatic/condition of teeth unchanged

## 2024-03-11 NOTE — BRIEF OP NOTE
[facility] - Surgery ([facility])  Brief Operative Note    SUMMARY     Surgery Date: 3/11/2024     Surgeon(s) and Role:     * Jeff Morris, DO - Primary     * Edmundo Ellington MD - Resident - Assisting        Pre-op Diagnosis:  Lumbar herniated disc [M51.26]    Post-op Diagnosis:  Post-Op Diagnosis Codes:     * Lumbar herniated disc [M51.26]    Procedure(s) (LRB):  LAMINECTOMY, SPINE, LUMBAR, WITH DISCECTOMY (Right)    Anesthesia: General    Implants:  * No implants in log *    Operative Findings:   L5-S1 right microdiscectomy  Large disc fragment removed  Dermabond for skin closure  See full op note for further details    Estimated Blood Loss: minimal             Specimens:   Specimen (24h ago, onward)      None            [id number]

## 2024-03-14 NOTE — ANESTHESIA POSTPROCEDURE EVALUATION
Anesthesia Post Evaluation    Patient: Eleazar Casillas    Procedure(s) Performed: Procedure(s) (LRB):  LAMINECTOMY, SPINE, LUMBAR, WITH DISCECTOMY (Right)    Final Anesthesia Type: general      Patient location during evaluation: PACU  Patient participation: Yes- Able to Participate  Level of consciousness: awake and alert and oriented  Post-procedure vital signs: reviewed and stable  Pain management: adequate  Airway patency: patent    PONV status at discharge: No PONV  Anesthetic complications: no      Cardiovascular status: hemodynamically stable  Respiratory status: unassisted, spontaneous ventilation and room air  Hydration status: euvolemic  Follow-up not needed.              Vitals Value Taken Time   /62 03/11/24 1317   Temp 36.9 °C (98.4 °F) 03/11/24 1300   Pulse 87 03/11/24 1321   Resp 28 03/11/24 1321   SpO2 100 % 03/11/24 1321   Vitals shown include unvalidated device data.      No case tracking events are documented in the log.      Pain/Lamine Score: No data recorded

## 2024-03-19 ENCOUNTER — TELEPHONE (OUTPATIENT)
Dept: NEUROSURGERY | Facility: CLINIC | Age: 39
End: 2024-03-19
Payer: COMMERCIAL

## 2024-03-19 DIAGNOSIS — Z98.890 S/P LUMBAR LAMINECTOMY: Primary | ICD-10-CM

## 2024-03-19 DIAGNOSIS — M51.26 LUMBAR HERNIATED DISC: ICD-10-CM

## 2024-03-19 RX ORDER — ONDANSETRON 4 MG/1
4 TABLET, ORALLY DISINTEGRATING ORAL EVERY 8 HOURS PRN
Qty: 30 TABLET | Refills: 0 | Status: SHIPPED | OUTPATIENT
Start: 2024-03-19

## 2024-03-19 NOTE — TELEPHONE ENCOUNTER
Returned call to patient to schedule post op appointments. Pt stated he had some nausea and vomiting over the weekend and trouble keeping things down and asked if we could prescribe him something to help. He stated he has not vomited today and was able to keep food down but still has some nausea. Per PA, Zofran will be sent to his pharmacy. Advised patient to let us know if he still is experiencing nausea/vomiting after taking Zofran for a couple days. Also told patient to let us know if he begins having fevers or any signs of infection. Patient vu and confirmed post op appt dates.     ----- Message from Tay Roberts sent at 3/19/2024 12:52 PM CDT -----  Regarding: Pt Advice/Results  Contact: pt [phone]  Pt had surgery 3/11, was inform that provider/nurse would reach out to pt within 1 weeks, please call pt @[phone]

## 2024-03-25 ENCOUNTER — TELEPHONE (OUTPATIENT)
Dept: NEUROSURGERY | Facility: CLINIC | Age: 39
End: 2024-03-25
Payer: COMMERCIAL

## 2024-03-25 DIAGNOSIS — Z98.890 S/P LUMBAR LAMINECTOMY: Primary | ICD-10-CM

## 2024-03-25 RX ORDER — METHOCARBAMOL 500 MG/1
500 TABLET, FILM COATED ORAL 4 TIMES DAILY PRN
Qty: 40 TABLET | Refills: 0 | Status: SHIPPED | OUTPATIENT
Start: 2024-03-25 | End: 2024-04-04

## 2024-03-25 RX ORDER — ACETAMINOPHEN 500 MG
1000 TABLET ORAL 3 TIMES DAILY
Qty: 40 TABLET | Refills: 0 | Status: SHIPPED | OUTPATIENT
Start: 2024-03-25

## 2024-03-25 NOTE — TELEPHONE ENCOUNTER
Returned call to patient. He stated he is almost out of pain medication but would like to try and see if he can switch to a non-narcotic and tolerate the pain. Per PA, tylenol 1000 mg and robaxin will be sent to his pharmacy and patient is in agreance with this.     ----- Message from Sadia Elizabeth sent at 3/25/2024  2:26 PM CDT -----  Regarding: Medication Request  Contact: [phone]  Pt is calling stating that he would like provider to send in pain medication for a non narcotic pain medication pt states. Please give pt a call to further discuss.       [id number] - [place] - [address]  AT [facility] [address]  [address] [place] [address]  Phone: [phone] Fax: [phone]

## 2024-03-28 ENCOUNTER — CLINICAL SUPPORT (OUTPATIENT)
Dept: NEUROSURGERY | Facility: CLINIC | Age: 39
End: 2024-03-28
Payer: COMMERCIAL

## 2024-03-28 VITALS — TEMPERATURE: 98 F

## 2024-03-28 DIAGNOSIS — Z98.890 S/P LUMBAR LAMINECTOMY: Primary | ICD-10-CM

## 2024-03-28 DIAGNOSIS — M51.26 LUMBAR HERNIATED DISC: ICD-10-CM

## 2024-03-28 NOTE — PROGRESS NOTES
Mr. Casillas  is a 37 y/o who underwent lumbar laminectomy and discectomy by Dr. Morris on 3/11/24.  (For complete diagnosis and procedure, see OP note.) Pt presents ambulatory, gait steady, in NAD. Denies any fever, chills.     Pt reports doing overall well since surgery.  Reports gait is slowly improving.  Periods of shooting pain in right leg.  Taking muscle relaxer and tylenol for pain control.  Referral for PT will be placed to help patient with improving gait.     Incision C/D/I without any signs of redness or infection. No drainage noted. Wound edges are approximated. Pt has dissolvable sutures.   Pt received updated post-op care instructions.  All questions were answered. Patient was encouraged to call clinic with any future concerns.

## 2024-04-24 ENCOUNTER — OFFICE VISIT (OUTPATIENT)
Dept: NEUROSURGERY | Facility: CLINIC | Age: 39
End: 2024-04-24
Payer: COMMERCIAL

## 2024-04-24 VITALS — HEART RATE: 93 BPM | DIASTOLIC BLOOD PRESSURE: 77 MMHG | SYSTOLIC BLOOD PRESSURE: 118 MMHG

## 2024-04-24 DIAGNOSIS — M51.26 LUMBAR HERNIATED DISC: Primary | ICD-10-CM

## 2024-04-24 PROCEDURE — 99024 POSTOP FOLLOW-UP VISIT: CPT | Mod: S$GLB,,, | Performed by: PHYSICIAN ASSISTANT

## 2024-04-24 PROCEDURE — 1159F MED LIST DOCD IN RCRD: CPT | Mod: CPTII,S$GLB,, | Performed by: PHYSICIAN ASSISTANT

## 2024-04-24 PROCEDURE — 99999 PR PBB SHADOW E&M-EST. PATIENT-LVL III: CPT | Mod: PBBFAC,,, | Performed by: PHYSICIAN ASSISTANT

## 2024-04-24 PROCEDURE — 3078F DIAST BP <80 MM HG: CPT | Mod: CPTII,S$GLB,, | Performed by: PHYSICIAN ASSISTANT

## 2024-04-24 PROCEDURE — 3074F SYST BP LT 130 MM HG: CPT | Mod: CPTII,S$GLB,, | Performed by: PHYSICIAN ASSISTANT

## 2024-04-24 RX ORDER — METHYLPREDNISOLONE 4 MG/1
TABLET ORAL
Qty: 21 EACH | Refills: 0 | Status: SHIPPED | OUTPATIENT
Start: 2024-04-24 | End: 2024-05-15

## 2024-04-25 NOTE — PROGRESS NOTES
Neurosurgery  Established Patient    SUBJECTIVE:     History of Present Illness:  Eleazra Casillas is a 38 y.o. male who presents for 6 week follow up s/p right MIS L5-S1 laminectomy with discectomy on 3/11/24 by Dr. Morris. Patient originally presented with complaints of right leg pain in S1 distribution. MRI lumbar spine showed a large right sided disc herniation at L5-S1. The decision was made to take him to the operating room for discectomy. Patient reports post operatively he had some improvement in his right leg pain. However, his leg pain still persists. He reports it still remains better than his post operative leg pain. He is no longer taking an pain medication. He reports he is walking better post operatively. Denies weakness or bowel/bladder issues.    Review of patient's allergies indicates:  No Known Allergies    Current Outpatient Medications   Medication Sig Dispense Refill    acetaminophen (TYLENOL) 500 MG tablet Take 2 tablets (1,000 mg total) by mouth 3 (three) times daily. 40 tablet 0    HYDROcodone-acetaminophen (NORCO) 5-325 mg per tablet Take 1 tablet by mouth every 8 (eight) hours as needed for Pain. 15 tablet 0    LIDOcaine (LIDODERM) 5 % Place 1 patch onto the skin once daily. Remove & Discard patch within 12 hours or as directed by MD 15 patch 0    ondansetron (ZOFRAN-ODT) 4 MG TbDL Take 1 tablet (4 mg total) by mouth every 8 (eight) hours as needed. 30 tablet 0    oxyCODONE-acetaminophen (PERCOCET)  mg per tablet Take 1 tablet by mouth every 4 (four) hours as needed for Pain.      gabapentin (NEURONTIN) 300 MG capsule Take 1 capsule (300 mg total) by mouth 3 (three) times daily. 90 capsule 0    methylPREDNISolone (MEDROL DOSEPACK) 4 mg tablet use as directed 21 each 0    oxyCODONE-acetaminophen (PERCOCET)  mg per tablet Take 1 tablet by mouth every 4 (four) hours as needed for Pain. (Patient not taking: Reported on 4/24/2024) 24 tablet 0     No current facility-administered  medications for this visit.       Past Medical History:   Diagnosis Date    Cervical herniated disc 9/20/2019    Left cervical radiculopathy 9/21/2019    Numbness and tingling in left hand 9/21/2019    Radiculopathy of cervical spine 9/21/2019     Past Surgical History:   Procedure Laterality Date    ANTERIOR CERVICAL DISCECTOMY W/ FUSION  9/21/2019    Procedure: DISCECTOMY, SPINE, CERVICAL, ANTERIOR APPROACH, WITH FUSION, C5-C6;  Surgeon: Jeff Morris DO;  Location: [facility] OR [address] [place] [address];  Service: Neurosurgery;;    LUMBAR LAMINECTOMY WITH DISCECTOMY Right 3/11/2024    Procedure: LAMINECTOMY, SPINE, LUMBAR, WITH DISCECTOMY;  Surgeon: Jeff Morris DO;  Location: [facility] OR [address] [place] [address];  Service: Neurosurgery;  Laterality: Right;     Family History    None       Social History     Socioeconomic History    Marital status: Single   Tobacco Use    Smoking status: Every Day     Current packs/day: 1.00     Types: Cigarettes    Smokeless tobacco: Never   Substance and Sexual Activity    Alcohol use: Not Currently    Drug use: Not Currently     Comment: suboxoe intermittently        Review of Systems    OBJECTIVE:     Vital Signs  Pulse: 93  BP: 118/77  Pain Score:   7  There is no height or weight on file to calculate BMI.    Neurosurgery Physical Exam  General: well developed, well nourished, no distress.   Head: normocephalic, atraumatic  Neurologic: Alert and oriented. Thought content appropriate.  GCS: Motor: 6/Verbal: 5/Eyes: 4 GCS Total: 15  Mental Status: Awake, Alert, Oriented x 4  Language: No aphasia  Speech: No dysarthria  Cranial nerves: face symmetric, tongue midline, CN II-XII grossly intact.   Eyes: pupils equal, round, reactive to light with accommodation, EOMI.   Pulmonary: normal respirations, no signs of respiratory distress  Abdomen: soft, non-distended, not tender to palpation  Skin: Skin is warm, dry and intact.  Sensory: intact to light touch throughout    Motor Strength:Moves all extremities spontaneously  with good tone.  Full strength upper and lower extremities. No abnormal movements seen.     Strength  Deltoids Triceps Biceps Wrist Extension Wrist Flexion Hand    Upper: R 5/5 5/5 5/5 5/5 5/5 5/5    L 5/5 5/5 5/5 5/5 5/5 5/5     Iliopsoas Quadriceps Knee  Flexion Tibialis  anterior Gastro- cnemius EHL   Lower: R 5/5 5/5 5/5 5/5 5/5 5/5    L 5/5 5/5 5/5 5/5 5/5 5/5   Incision well healed           Diagnostic Results:  No pertinent imaging available     ASSESSMENT/PLAN:     Eleazar Casillas is a 38 y.o. male s/p right MIS L5-S1 laminectomy with discectomy on 3/11/24 by Dr. Morris. He continues to have right leg pain post op, however this is improved from his pre op pain. He is no longer on pain medication. We will try a medrol dosepack to see if he has improvement in his symptoms. Discussed it may take some time to see complete improvement/resolution of his right leg pain if possible. He was instructed should his leg or back pain acutely worsen or should be develop bowel/bladder issues or foot drop to contact our clinic immediately or go to nearest ED. He voiced understanding. We will have him follow up in 4 weeks to see how he is doing.

## 2024-04-28 NOTE — PROGRESS NOTES
[facility]   Physical Therapy Initial Evaluation      Name: Eleazar Casillas  Clinic Number: [id number]    Therapy Diagnosis:   Encounter Diagnoses   Name Primary?    S/P lumbar laminectomy     Lumbar herniated disc     Decreased range of motion of lumbar spine Yes        Physician: Parish Rodas MD    Physician Orders: PT Eval and Treat   Medical Diagnosis from Referral: S/P lumbar laminectomy [Z98.890], Lumbar herniated disc [M51.26]   Evaluation Date: 4/29/2024  Authorization Period Expiration: 4/16/2024 - 12/31/2024  Plan of Care Expiration: 7/29/24  Progress Note Due: 5/29/24    Date of Surgery: 3/11/24  Post Op Day: 6 weeks, 6 days as of 4/29/24    Visit # / Visits authorized: 1/1   FOTO: 1/ 3    Precautions: Standard and Limit Spine motions to neutral at this time; Bending and Twisting as tolerated       Time In: 10:05 AM  Time Out: 11:00 AM  Total Billable Time: 55 minutes    Subjective     Date of onset: surgery was 3/11/24; injury was approximately 3 months ago and started with Right Leg pain and back pain     History of current condition - Eleazar reports: he had a lumbar laminectomy surgery 6 weeks ago for right sided leg pain and back pain. He states the surgery was deemed emergent by the time his disc had ruptured and started to affect his gait, causing right leg pain with walking and sitting as well as when he was sleeping. Patient states no mechanism for original onset of this pain. He states following surgery, he had experienced 1-2 days of slight pain relief, then awoke with similar right leg pain and right LBP on day 4 following surgery. Patient states he has been at work again since 2 weeks ago and was given precautions to refrain from painful bending, twisting, and told not to lift over 5-10 pounds.       Falls: none reported since surgery    Imaging: see EMR    Prior Therapy: PT at [facility] prior to surgery  Social History:  lives alone  Occupation: working  light duty right now at [employer]  Prior Level of Function: indep prior to injury  Current Level of Function: limited with gait and Right leg pain     Pain:  Current 5/10, worst 8/10, best 4/10   Location: right low back and right leg pain  Description: Grabbing, Tight, Sharp, Electric, and Shooting  Aggravating Factors: Sitting, Coughing/Sneezing, Standing, Laying, Bending, Walking, Extension, Lifting, and Getting out of bed/chair  Easing Factors: pain medication    Patients goals: be able to walk without back pain and leg pain; return to work without limitations     Medical History:   Past Medical History:   Diagnosis Date    Cervical herniated disc 9/20/2019    Left cervical radiculopathy 9/21/2019    Numbness and tingling in left hand 9/21/2019    Radiculopathy of cervical spine 9/21/2019       Surgical History:   Eleazar Casillas  has a past surgical history that includes Anterior cervical discectomy w/ fusion (9/21/2019) and Lumbar laminectomy with discectomy (Right, 3/11/2024).    Medications:   Eleazar has a current medication list which includes the following prescription(s): acetaminophen, gabapentin, hydrocodone-acetaminophen, lidocaine, methylprednisolone, ondansetron, oxycodone-acetaminophen, and oxycodone-acetaminophen.    Allergies:   Review of patient's allergies indicates:  No Known Allergies     Objective      Observation: alert, oriented, anxious     Posture:  ambulates into clinic with decreased time spent on Right LE during stance phase; left lateral trunk lean; decreased knee flexion/extension throughout gait     Lumbar Range of Motion:    Limitations Pain   Flexion 75%   Into right LE posteriorly down to calf        Extension 75%   Right LE posteriorly down to calf         Left Side Bending 75% Right low back and Right LE         Right Side Bending 75% Right low back and right LE            Lower Extremity Strength  Right LE  Left LE    Quadriceps: 4-/5 Quadriceps: 4+/5   Hamstrings:  3+/5 Hamstrings: 4/5   Iliospoas: 3/5 Iliospoas: 4/5   Hip extension:  3-/5 Hip extension: 3+/5   Hip abduction seated: 4-/5 Hip abduction seated: 4/5   Ankle dorsiflexion: 4-/5 Ankle dorsiflexion: 4/5   Ankle plantarflexion: 4-/5 Ankle plantarflexion: 4/5     Sensation: intact light touch L2-S1     Reflexes:  -Patellar (L3-L4): 1+ right, 2+ Left    -Achilles (S1): 2+     Special Test:  SLR = +   Crossed SLR = +   Slump = + for Right leg pain down to posterior calf    Joint Mobility:   NT 2/2 post op     Palpation:   -Erector Spinae: increased tone of right paraspinals     Intake Outcome Measure for FOTO Lumbar Survey    Therapist reviewed FOTO scores for Eleazar Casillas on 4/29/2024.   FOTO report - see Media section or FOTO account episode details.    Intake Score: 56% (73% predicted)          Treatment     Total Treatment time (time-based codes) separate from Evaluation: 15 minutes     Eleazar received the treatments listed below:      therapeutic activities to improve functional performance for 15 minutes, including:    Education:   - Postural education   - Sleeping positions and seated positions for improving resting pain levels   - Role of PT   - POC/Prognosis     Patient Education and Home Exercises     Education provided:   - HEP     Written Home Exercises Provided: yes. Exercises were reviewed and Eleazar was able to demonstrate them prior to the end of the session.  Eleazar demonstrated good  understanding of the education provided. See EMR under Patient Instructions for exercises provided during therapy sessions.    Assessment     Eleazar is a 38 y.o. male referred to outpatient Physical Therapy with a medical diagnosis of S/P lumbar laminectomy [Z98.890], Lumbar herniated disc [M51.26]. Patient presents 6 weeks, 6 days status post right MIS L5-S1 laminectomy with discectomy and demonstrates highly irritable symptoms into his right low back and right posterior leg with all cardinal plane  range of motion. He is unable to tolerate prolonged positions at this time and was limited in full assessment due to pain levels today. Patient educated on improvements in optimal posturing for seated and sleeping positions in order to assist with reduction of pain levels at this time. He presents with a lateral shift and may benefit from traction based treatment approach in future visits. Patient would benefit from skilled therapy at this time to assist with optimal surgical recovery and return to prior level of function.       Patient prognosis is Guarded.   Patient will benefit from skilled outpatient Physical Therapy to address the deficits stated above and in the chart below, provide patient /family education, and to maximize patientt's level of independence.     Plan of care discussed with patient: Yes  Patient's spiritual, cultural and educational needs considered and patient is agreeable to the plan of care and goals as stated below:     Anticipated Barriers for therapy: high level of symptom irritability     Medical Necessity is demonstrated by the following  History  Co-morbidities and personal factors that may impact the plan of care [] LOW: no personal factors / co-morbidities  [x] MODERATE: 1-2 personal factors / co-morbidities  [] HIGH: 3+ personal factors / co-morbidities    Moderate / High Support Documentation:   Co-morbidities affecting plan of care: prior history of fusion cervical spine    Personal Factors:   no deficits     Examination  Body Structures and Functions, activity limitations and participation restrictions that may impact the plan of care [] LOW: addressing 1-2 elements  [x] MODERATE: 3+ elements  [] HIGH: 4+ elements (please support below)    Moderate / High Support Documentation: assessment noted above      Clinical Presentation [] LOW: stable  [x] MODERATE: Evolving  [] HIGH: Unstable     Decision Making/ Complexity Score: moderate       Goals:  Short Term Goals:  4  weeks  1.Report decreased low back and right leg pain  < / =  3/10  to increase tolerance for progressing with gait.  2. Increase ROM by 10-15 degrees where limited in order to perform ADLs without difficulty.  3. Increase strength by 1/3 MMT grade in lower extremities to increase tolerance for ADL and work activities.  4. Pt to tolerate HEP to improve ROM and independence with ADL's    Long Term Goals: 12 weeks  1.Report decreased low back and right leg pain < / = 1/10  to increase tolerance for return to normal gait and lifting activities.   2.Patient goal: be able to walk without right low back pain or right leg pain.   3.Increase strength to 4+/5 in  lower extremities to increase tolerance for ADL and work activities.  4. Pt will report at >/= 73% on FOTO  to demonstrate increase in LE function with every day tasks.    5. Patient will demonstrate negative SLR and slump test to show improvement in neural tension symptoms into lower extremities.   Plan     Plan of care Certification: 4/29/2024 to 7/29/24.    Outpatient Physical Therapy 2 times weekly for 12 weeks to include the following interventions: Gait Training, Manual Therapy, Moist Heat/ Ice, Neuromuscular Re-ed, Patient Education, Therapeutic Activities, and Therapeutic Exercise.     Davion Peterson, PT      Staci Umanzor, PT, DPT, OCS (co-treating therapist)       Physician's Signature: _________________________________________ Date: ________________

## 2024-04-29 ENCOUNTER — CLINICAL SUPPORT (OUTPATIENT)
Dept: REHABILITATION | Facility: HOSPITAL | Age: 39
End: 2024-04-29
Attending: NEUROLOGICAL SURGERY
Payer: COMMERCIAL

## 2024-04-29 DIAGNOSIS — Z98.890 S/P LUMBAR LAMINECTOMY: ICD-10-CM

## 2024-04-29 DIAGNOSIS — M53.86 DECREASED RANGE OF MOTION OF LUMBAR SPINE: Primary | ICD-10-CM

## 2024-04-29 DIAGNOSIS — M51.26 LUMBAR HERNIATED DISC: ICD-10-CM

## 2024-04-29 PROCEDURE — 97162 PT EVAL MOD COMPLEX 30 MIN: CPT

## 2024-04-29 PROCEDURE — 97530 THERAPEUTIC ACTIVITIES: CPT

## 2024-04-30 PROBLEM — M53.86 DECREASED RANGE OF MOTION OF LUMBAR SPINE: Status: ACTIVE | Noted: 2024-04-30

## 2024-04-30 PROBLEM — Z98.890 S/P LUMBAR LAMINECTOMY: Status: ACTIVE | Noted: 2024-04-30

## 2024-05-01 ENCOUNTER — TELEPHONE (OUTPATIENT)
Dept: NEUROSURGERY | Facility: CLINIC | Age: 39
End: 2024-05-01
Payer: COMMERCIAL

## 2024-05-01 ENCOUNTER — CLINICAL SUPPORT (OUTPATIENT)
Dept: REHABILITATION | Facility: HOSPITAL | Age: 39
End: 2024-05-01
Payer: COMMERCIAL

## 2024-05-01 DIAGNOSIS — Z98.890 S/P LUMBAR LAMINECTOMY: Primary | ICD-10-CM

## 2024-05-01 DIAGNOSIS — M53.86 DECREASED RANGE OF MOTION OF LUMBAR SPINE: ICD-10-CM

## 2024-05-01 DIAGNOSIS — M54.9 DORSALGIA, UNSPECIFIED: Primary | ICD-10-CM

## 2024-05-01 PROCEDURE — 97112 NEUROMUSCULAR REEDUCATION: CPT

## 2024-05-01 PROCEDURE — 97140 MANUAL THERAPY 1/> REGIONS: CPT

## 2024-05-01 NOTE — PROGRESS NOTES
"  Physical Therapy Daily Treatment Note     Name: Eleazar Casillas  Clinic Number: [id number]    Therapy Diagnosis:   Encounter Diagnoses   Name Primary?    S/P lumbar laminectomy Yes    Decreased range of motion of lumbar spine      Physician: Damaris Whitmroe NP    Visit Date: 5/1/2024  Physician Orders: PT Eval and Treat   Medical Diagnosis from Referral: S/P lumbar laminectomy [Z98.890], Lumbar herniated disc [M51.26]   Evaluation Date: 4/29/2024  Authorization Period Expiration: 4/16/2024 - 12/31/2024  Plan of Care Expiration: 7/29/24  Progress Note Due: 5/29/24  Date of Surgery: 3/11/24  Post Op Day: 6 weeks, 6 days as of 4/29/24  Visit # / Visits authorized: 2/20   FOTO: 1/ 3  Time In: 8:05 am  Time Out: 9:00 pm  Total Billable Time: 55 minutes  Precautions: Precautions: Standard and Limit Spine Motions to neutral at this time; Bending and Twisting as tolerated    Subjective   Pt reports: he continues to feel "about the same."  He was not provided with home exercise program, but did comply with education provided.  Response to previous treatment: Initial evaluation  Functional change: Ongoing    Pain: 7/10  Location: Right back and posterior gluteal region, thigh and into calf-region.    Objective     Eleazar received therapeutic exercises to develop strength, endurance, ROM, flexibility, posture, and core stabilization for 0 minutes including:    Eleazar received the following manual therapy techniques: Joint mobilizations were applied for 42 minutes, including:  Manual Traction to Lumbar Spine, 7x30" hold + Set-Up, Grade III-IV  Manual Traction to Lumbar Spine + 2nd Clinician Manual Lateral Shift Corrections  Prone CPA Generalized Lumbar, gradual overpressure, (attempted with legs shifted for lateral correction, but not pursued due to peripheralization)      Eleazar participated in neuromuscular re-education activities to improve: Balance, Coordination, Kinesthetic, Sense, Proprioception, and " "Posture for 13 minutes. The following activities were included:  TrA Activation, 10x10" hold  Gluteal Sets, 10x6" hold  Objective measures taken (see above)    Home Exercises Provided and Patient Education Provided     Education provided:   - Findings of evaluation and examination, and affect of these on plan for treatment  -Prognosis and expectations  -Role of PT and team-centered care for patient  -Home exercise program and expectations of therapy    Written Home Exercises Provided: yes.  Exercises were reviewed and Eleazar was able to demonstrate them prior to the end of the session.  Eleazar demonstrated good  understanding of the education provided.     See EMR under Patient Instructions for exercises provided prior visit.    Assessment   Eleazar arrives with significant limitation of lumbar extension and flexion, with lumbar dominant trunk strategy for flexion. His symptoms are peripheralized and most relieved by manual traction, with increased ability to perform lumbar spine extension upon conclusion. Significant guarding is present with lumbar PA mobilizations, and activation of right lumbar paraspinals.    Eleazar Is progressing well towards his goals.   Pt prognosis is Fair.     Pt will continue to benefit from skilled outpatient physical therapy to address the deficits listed in the problem list box on initial evaluation, provide pt/family education and to maximize pt's level of independence in the home and community environment.     Pt's spiritual, cultural and educational needs considered and pt agreeable to plan of care and goals.     Anticipated Barriers to physical therapy: High level of symptom irritability    Goals: Short Term Goals:  4 weeks  1.Report decreased low back and right leg pain  < / =  3/10  to increase tolerance for progressing with gait.  2. Increase ROM by 10-15 degrees where limited in order to perform ADLs without difficulty.  3. Increase strength by 1/3 MMT grade " in lower extremities to increase tolerance for ADL and work activities.  4. Pt to tolerate HEP to improve ROM and independence with ADL's     Long Term Goals: 12 weeks  1.Report decreased low back and right leg pain < / = 1/10  to increase tolerance for return to normal gait and lifting activities.   2.Patient goal: be able to walk without right low back pain or right leg pain.   3.Increase strength to 4+/5 in  lower extremities to increase tolerance for ADL and work activities.  4. Pt will report at >/= 73% on FOTO  to demonstrate increase in LE function with every day tasks.    5. Patient will demonstrate negative SLR and slump test to show improvement in neural tension symptoms into lower extremities.  Plan     Continue to progress with centralization of symptoms, addressing lateral and posterior components, functional training and activity modification.    Staci Umanzor, PT, DPT  Board Certified in Orthopedic Physical Therapy

## 2024-05-01 NOTE — PLAN OF CARE
[facility]   Physical Therapy Initial Evaluation      Name: Eleazar Casillas  Clinic Number: [id number]    Therapy Diagnosis:   Encounter Diagnoses   Name Primary?    S/P lumbar laminectomy     Lumbar herniated disc     Decreased range of motion of lumbar spine Yes        Physician: Parish Rodas MD    Physician Orders: PT Eval and Treat   Medical Diagnosis from Referral: S/P lumbar laminectomy [Z98.890], Lumbar herniated disc [M51.26]   Evaluation Date: 4/29/2024  Authorization Period Expiration: 4/16/2024 - 12/31/2024  Plan of Care Expiration: 7/29/24  Progress Note Due: 5/29/24    Date of Surgery: 3/11/24  Post Op Day: 6 weeks, 6 days as of 4/29/24    Visit # / Visits authorized: 1/1   FOTO: 1/ 3    Precautions: Standard and Limit Spine motions to neutral at this time; Bending and Twisting as tolerated      Time In: 10:05 AM  Time Out: 11:00 AM  Total Billable Time: 55 minutes    Subjective     Date of onset: surgery was 3/11/24; injury was approximately 3 months ago and started with Right Leg pain and back pain     History of current condition - Eleazar reports: he had a lumbar laminectomy surgery 6 weeks ago for right sided leg pain and back pain. He states the surgery was deemed emergent by the time his disc had ruptured and started to affect his gait, causing right leg pain with walking and sitting as well as when he was sleeping. Patient states no mechanism for original onset of this pain. He states following surgery, he had experienced 1-2 days of slight pain relief, then awoke with similar right leg pain and right LBP on day 4 following surgery. Patient states he has been at work again since 2 weeks ago and was given precautions to refrain from painful bending, twisting, and told not to lift over 5-10 pounds.       Falls: none reported since surgery    Imaging: see EMR    Prior Therapy: PT at [place] prior to surgery  Social History:  lives alone  Occupation: working  light duty right now at [employer]  Prior Level of Function: indep prior to injury  Current Level of Function: limited with gait and Right leg pain     Pain:  Current 5/10, worst 8/10, best 4/10   Location: right low back and right leg pain  Description: Grabbing, Tight, Sharp, Electric, and Shooting  Aggravating Factors: Sitting, Coughing/Sneezing, Standing, Laying, Bending, Walking, Extension, Lifting, and Getting out of bed/chair  Easing Factors: pain medication    Patients goals: be able to walk without back pain and leg pain; return to work without limitations     Medical History:   Past Medical History:   Diagnosis Date    Cervical herniated disc 9/20/2019    Left cervical radiculopathy 9/21/2019    Numbness and tingling in left hand 9/21/2019    Radiculopathy of cervical spine 9/21/2019       Surgical History:   Eleazar Casillas  has a past surgical history that includes Anterior cervical discectomy w/ fusion (9/21/2019) and Lumbar laminectomy with discectomy (Right, 3/11/2024).    Medications:   Eleazar has a current medication list which includes the following prescription(s): acetaminophen, gabapentin, hydrocodone-acetaminophen, lidocaine, methylprednisolone, ondansetron, oxycodone-acetaminophen, and oxycodone-acetaminophen.    Allergies:   Review of patient's allergies indicates:  No Known Allergies     Objective      Observation: alert, oriented, anxious     Posture:  ambulates into clinic with decreased time spent on Right LE during stance phase; left lateral trunk lean; decreased knee flexion/extension throughout gait     Lumbar Range of Motion:    Limitations Pain   Flexion 75%   Into right LE posteriorly down to calf        Extension 75%   Right LE posteriorly down to calf         Left Side Bending 75% Right low back and Right LE         Right Side Bending 75% Right low back and right LE            Lower Extremity Strength  Right LE  Left LE    Quadriceps: 4-/5 Quadriceps: 4+/5   Hamstrings:  3+/5 Hamstrings: 4/5   Iliospoas: 3/5 Iliospoas: 4/5   Hip extension:  3-/5 Hip extension: 3+/5   Hip abduction seated: 4-/5 Hip abduction seated: 4/5   Ankle dorsiflexion: 4-/5 Ankle dorsiflexion: 4/5   Ankle plantarflexion: 4-/5 Ankle plantarflexion: 4/5     Sensation: intact light touch L2-S1     Reflexes:  -Patellar (L3-L4): 1+ right, 2+ Left    -Achilles (S1): 2+     Special Test:  SLR = +   Crossed SLR = +   Slump = + for Right leg pain down to posterior calf    Joint Mobility:   NT 2/2 post op     Palpation:   -Erector Spinae: increased tone of right paraspinals     Intake Outcome Measure for FOTO Lumbar Survey    Therapist reviewed FOTO scores for Eleazar Casillas on 4/29/2024.   FOTO report - see Media section or FOTO account episode details.    Intake Score: 56% (73% predicted)          Treatment     Total Treatment time (time-based codes) separate from Evaluation: 15 minutes     Eleazar received the treatments listed below:      therapeutic activities to improve functional performance for 15 minutes, including:    Education:   - Postural education   - Sleeping positions and seated positions for improving resting pain levels   - Role of PT   - POC/Prognosis     Patient Education and Home Exercises     Education provided:   - HEP     Written Home Exercises Provided: yes. Exercises were reviewed and Eleazar was able to demonstrate them prior to the end of the session.  Eleazar demonstrated good  understanding of the education provided. See EMR under Patient Instructions for exercises provided during therapy sessions.    Assessment     Eleazar is a 38 y.o. male referred to outpatient Physical Therapy with a medical diagnosis of S/P lumbar laminectomy [Z98.890], Lumbar herniated disc [M51.26]. Patient presents 6 weeks, 6 days status post right MIS L5-S1 laminectomy with discectomy and demonstrates highly irritable symptoms into his right low back and right posterior leg with all cardinal plane  range of motion. He is unable to tolerate prolonged positions at this time and was limited in full assessment due to pain levels today. Patient educated on improvements in optimal posturing for seated and sleeping positions in order to assist with reduction of pain levels at this time. He presents with a lateral shift and may benefit from traction based treatment approach in future visits. Patient would benefit from skilled therapy at this time to assist with optimal surgical recovery and return to prior level of function.       Patient prognosis is Guarded.   Patient will benefit from skilled outpatient Physical Therapy to address the deficits stated above and in the chart below, provide patient /family education, and to maximize patientt's level of independence.     Plan of care discussed with patient: Yes  Patient's spiritual, cultural and educational needs considered and patient is agreeable to the plan of care and goals as stated below:     Anticipated Barriers for therapy: high level of symptom irritability     Medical Necessity is demonstrated by the following  History  Co-morbidities and personal factors that may impact the plan of care [] LOW: no personal factors / co-morbidities  [x] MODERATE: 1-2 personal factors / co-morbidities  [] HIGH: 3+ personal factors / co-morbidities    Moderate / High Support Documentation:   Co-morbidities affecting plan of care: prior history of fusion cervical spine    Personal Factors:   no deficits     Examination  Body Structures and Functions, activity limitations and participation restrictions that may impact the plan of care [] LOW: addressing 1-2 elements  [x] MODERATE: 3+ elements  [] HIGH: 4+ elements (please support below)    Moderate / High Support Documentation: assessment noted above      Clinical Presentation [] LOW: stable  [x] MODERATE: Evolving  [] HIGH: Unstable     Decision Making/ Complexity Score: moderate       Goals:  Short Term Goals:  4  weeks  1.Report decreased low back and right leg pain  < / =  3/10  to increase tolerance for progressing with gait.  2. Increase ROM by 10-15 degrees where limited in order to perform ADLs without difficulty.  3. Increase strength by 1/3 MMT grade in lower extremities to increase tolerance for ADL and work activities.  4. Pt to tolerate HEP to improve ROM and independence with ADL's    Long Term Goals: 12 weeks  1.Report decreased low back and right leg pain < / = 1/10  to increase tolerance for return to normal gait and lifting activities.   2.Patient goal: be able to walk without right low back pain or right leg pain.   3.Increase strength to 4+/5 in  lower extremities to increase tolerance for ADL and work activities.  4. Pt will report at >/= 73% on FOTO  to demonstrate increase in LE function with every day tasks.    5. Patient will demonstrate negative SLR and slump test to show improvement in neural tension symptoms into lower extremities.   Plan     Plan of care Certification: 4/29/2024 to 7/29/24.    Outpatient Physical Therapy 2 times weekly for 12 weeks to include the following interventions: Gait Training, Manual Therapy, Moist Heat/ Ice, Neuromuscular Re-ed, Patient Education, Therapeutic Activities, and Therapeutic Exercise.     Davion Peterson, PT      Staci Umanzor, PT, DPT, OCS (co-treating therapist)       Physician's Signature: _________________________________________ Date: ________________

## 2024-05-01 NOTE — TELEPHONE ENCOUNTER
Returned call to patient. Per Jose, can order MRI since he did not improve after steroid trial. Pt stated he had PT today and it temporarily relieved his pain. He wants to try more PT before doing the MRI to see if it helps. Pt stated he would like to call back next week after more PT sessions to update on the pain and see if he would like to do the MRI.    ----- Message -----  From: Dalia Recinos  Sent: 4/30/2024  12:29 PM CDT  To: Jose Hurd Staff  Subject: Patient Advice                                   Pt is calling to state provider told he if he still has shooting pain in leg to let provider know she they can do some imaging testing please call

## 2024-05-07 ENCOUNTER — CLINICAL SUPPORT (OUTPATIENT)
Dept: REHABILITATION | Facility: HOSPITAL | Age: 39
End: 2024-05-07
Payer: COMMERCIAL

## 2024-05-07 DIAGNOSIS — M53.86 DECREASED RANGE OF MOTION OF LUMBAR SPINE: ICD-10-CM

## 2024-05-07 DIAGNOSIS — Z98.890 S/P LUMBAR LAMINECTOMY: Primary | ICD-10-CM

## 2024-05-07 PROCEDURE — 97140 MANUAL THERAPY 1/> REGIONS: CPT

## 2024-05-07 PROCEDURE — 97112 NEUROMUSCULAR REEDUCATION: CPT

## 2024-05-07 NOTE — PROGRESS NOTES
"  Physical Therapy Daily Treatment Note     Name: Eleazar Casillas  Clinic Number: [id number]    Therapy Diagnosis:   Encounter Diagnoses   Name Primary?    S/P lumbar laminectomy Yes    Decreased range of motion of lumbar spine        Physician: Damaris Whitmore NP    Visit Date: 5/7/2024  Physician Orders: PT Eval and Treat   Medical Diagnosis from Referral: S/P lumbar laminectomy [Z98.890], Lumbar herniated disc [M51.26]   Evaluation Date: 4/29/2024  Authorization Period Expiration: 4/16/2024 - 12/31/2024  Plan of Care Expiration: 7/29/24  Progress Note Due: 5/29/24    Date of Surgery: 3/11/24  Post Op Day: 8 weeks, 0 days as of 5/7/24    Visit # / Visits authorized: 3/20   FOTO: 1/ 3  Time In: 9:00 AM  Time Out: 9:58 AM  Total Billable Time: 58 minutes    Precautions: Precautions: Standard and Limit Spine Motions to neutral at this time; Bending and Twisting as tolerated    Subjective   Pt reports: he felt a little better after last visit. States his right calf pain is not as intense when going into full extension and states he does not have the pain shooting down into his leg as often, but notes it is still difficult to put weight through the leg.     He was not provided with home exercise program, but did comply with education provided.    Response to previous treatment: improved neural tension   Functional change: Ongoing    Pain: 6/10  Location: Right back and posterior gluteal region, thigh and into calf-region.    Objective     Eleazar received therapeutic exercises to develop strength, endurance, ROM, flexibility, posture, and core stabilization for 0 minutes including:    Eleazar received the following manual therapy techniques: Joint mobilizations were applied for 30 minutes, including:    Manual Traction to Lumbar Spine, 10 x 30" hold + Set-Up, Grade III-IV  Lateral Hip distraction grade II/III    Not performed today:  Manual Traction to Lumbar Spine + 2nd Clinician Manual Lateral Shift " "Corrections  Prone CPA Generalized Lumbar, gradual overpressure, (attempted with legs shifted for lateral correction, but not pursued due to peripheralization)      Eleazar participated in neuromuscular re-education activities to improve: Balance, Coordination, Kinesthetic, Sense, Proprioception, and Posture for 28 minutes. The following activities were included:    TrA Activation, 15 x 10" hold  Gluteal Sets, 10 x 6" hold  Bridging 2 x 10   Bridges 10x with Manual Lumbar distraction holds   Repeated extension 10 reps to end-range    Home Exercises Provided and Patient Education Provided     Education provided:   - Findings of evaluation and examination, and affect of these on plan for treatment  -Prognosis and expectations  -Role of PT and team-centered care for patient  -Home exercise program and expectations of therapy    Written Home Exercises Provided: yes.  Exercises were reviewed and Eleazar was able to demonstrate them prior to the end of the session.  Eleazar demonstrated good  understanding of the education provided.     See EMR under Patient Instructions for exercises provided prior visit.    Assessment   Eleazar arrives with a moderate limitation of lumbar extension and flexion today but shows good improvement and reduced symptoms down into the right calf. Patient able to complete 10 reps of lumbar extension repeated motions following session with no symptoms reproduced below right glute and with slightly improved motion into extension. Lateral hip distraction also provided relief for glute symptoms today. His symptoms are most relieved by manual traction and lateral hip distraction, with increased ability to perform lumbar spine extension upon conclusion. He continues to have difficulty with putting full weight through his right LE.     Eleazar Is progressing well towards his goals.   Pt prognosis is Fair.     Pt will continue to benefit from skilled outpatient physical therapy to " address the deficits listed in the problem list box on initial evaluation, provide pt/family education and to maximize pt's level of independence in the home and community environment.     Pt's spiritual, cultural and educational needs considered and pt agreeable to plan of care and goals.     Anticipated Barriers to physical therapy: High level of symptom irritability    Goals: Short Term Goals:  4 weeks  1.Report decreased low back and right leg pain  < / =  3/10  to increase tolerance for progressing with gait.  2. Increase ROM by 10-15 degrees where limited in order to perform ADLs without difficulty.  3. Increase strength by 1/3 MMT grade in lower extremities to increase tolerance for ADL and work activities.  4. Pt to tolerate HEP to improve ROM and independence with ADL's     Long Term Goals: 12 weeks  1.Report decreased low back and right leg pain < / = 1/10  to increase tolerance for return to normal gait and lifting activities.   2.Patient goal: be able to walk without right low back pain or right leg pain.   3.Increase strength to 4+/5 in  lower extremities to increase tolerance for ADL and work activities.  4. Pt will report at >/= 73% on FOTO  to demonstrate increase in LE function with every day tasks.    5. Patient will demonstrate negative SLR and slump test to show improvement in neural tension symptoms into lower extremities.  Plan     Continue to progress with centralization of symptoms, addressing lateral and posterior components, functional training and activity modification.    Davion Peterson, PT, DPT

## 2024-05-13 ENCOUNTER — CLINICAL SUPPORT (OUTPATIENT)
Dept: REHABILITATION | Facility: HOSPITAL | Age: 39
End: 2024-05-13
Payer: COMMERCIAL

## 2024-05-13 DIAGNOSIS — M53.86 DECREASED RANGE OF MOTION OF LUMBAR SPINE: ICD-10-CM

## 2024-05-13 DIAGNOSIS — Z98.890 S/P LUMBAR LAMINECTOMY: Primary | ICD-10-CM

## 2024-05-13 PROCEDURE — 97112 NEUROMUSCULAR REEDUCATION: CPT

## 2024-05-13 PROCEDURE — 97140 MANUAL THERAPY 1/> REGIONS: CPT

## 2024-05-13 NOTE — PROGRESS NOTES
"  Physical Therapy Daily Treatment Note     Name: Eleazar Casillas  Clinic Number: [id number]    Therapy Diagnosis:   Encounter Diagnoses   Name Primary?    S/P lumbar laminectomy Yes    Decreased range of motion of lumbar spine        Physician: Damaris Whitmore NP    Visit Date: 5/13/2024  Physician Orders: PT Eval and Treat   Medical Diagnosis from Referral: S/P lumbar laminectomy [Z98.890], Lumbar herniated disc [M51.26]   Evaluation Date: 4/29/2024  Authorization Period Expiration: 4/16/2024 - 12/31/2024  Plan of Care Expiration: 7/29/24  Progress Note Due: 5/29/24    Date of Surgery: 3/11/24  Post Op Day: 8 weeks, 0 days as of 5/7/24    Visit # / Visits authorized: 4/20   FOTO: 1/ 3  Time In: 5:00 pm  Time Out: 5:55 pm  Total Billable Time: 55 minutes    Precautions: Precautions: Standard and Limit Spine Motions to neutral at this time; Bending and Twisting as tolerated    Subjective   Pt reports: he continues to feel some pain in his calf, but the gluteal pain has not been bothering him as much.  He was not provided with home exercise program, but did comply with education provided.  Response to previous treatment: improved neural tension   Functional change: Ongoing  Pain: 4/10  Location: Right back and posterior gluteal region, thigh and into calf-region.    Objective   Lumbar Flexion: (-) for concordant pain; pain with return to extension  Lumbar Extension: ~15 degrees, increased pain in the calf region  Straight-Leg Raise: (+) for increased tension in hamstring; no longer positive crossed-legged  Observation: Decreased lateral lean with greater right-sided paraspinal mass    Eleazar received therapeutic exercises to develop strength, endurance, ROM, flexibility, posture, and core stabilization for 0 minutes including:    Eleazar received the following manual therapy techniques: Joint mobilizations were applied for 23 minutes, including:  Manual Traction to Lumbar Spine, 10 x 30" hold + Set-Up, " "Grade III-IV  Inferior Hip Distraction Mobilization with Hip Flexion Grade II/III  Supine Thoracic Grade V Manipulation    Eleazar participated in neuromuscular re-education activities to improve: Balance, Coordination, Kinesthetic, Sense, Proprioception, and Posture for 37 minutes. The following activities were included:  TrA Activation with Biofeedback Cuff, 40-50 mmHg, 20 x 10" hold  Bridging, 3x8, 3" hold  TrA Activation with Biofeedback Cuff + BKFO with Red Band, 40-50 mmHg, 10x each side  Repeated Supine Slump Gliders, 15x, with towel    Home Exercises Provided and Patient Education Provided     Education provided:   - Findings of evaluation and examination, and affect of these on plan for treatment  -Prognosis and expectations  -Role of PT and team-centered care for patient  -Home exercise program and expectations of therapy    Written Home Exercises Provided: yes.  Exercises were reviewed and Eleazar was able to demonstrate them prior to the end of the session.  Eleazar demonstrated good  understanding of the education provided.     See EMR under Patient Instructions for exercises provided prior visit.    Assessment   Eleazar's standing posture exhibits improved lateral lean, with this posture noted to be only slightly present, albeit he continues to demonstrate increased right-sided paraspinal mass as compared to the left side. He arrives with decreased weightbearing on the right leg with antalgic gait pattern on the right. He demonstrates improved trunk extension range of motion, with peripheralization of symptoms during extension. There is no pain with trunk flexion, but there is with return from flexion back to standing position. Jaiden was able to greater tolerate bed mobility, transverse abdominus activation with distal extremity movement, and manual therapy this visit. Upon leaving he is able to ambulate with greater weight placed onto the right leg, with nearly normal gait pattern; " slight antalgia remaining.    Eleazar Is progressing well towards his goals.   Pt prognosis is Fair.     Pt will continue to benefit from skilled outpatient physical therapy to address the deficits listed in the problem list box on initial evaluation, provide pt/family education and to maximize pt's level of independence in the home and community environment.     Pt's spiritual, cultural and educational needs considered and pt agreeable to plan of care and goals.     Anticipated Barriers to physical therapy: High level of symptom irritability    Goals: Short Term Goals:  4 weeks  1.Report decreased low back and right leg pain  < / =  3/10  to increase tolerance for progressing with gait.  2. Increase ROM by 10-15 degrees where limited in order to perform ADLs without difficulty.  3. Increase strength by 1/3 MMT grade in lower extremities to increase tolerance for ADL and work activities.  4. Pt to tolerate HEP to improve ROM and independence with ADL's     Long Term Goals: 12 weeks  1.Report decreased low back and right leg pain < / = 1/10  to increase tolerance for return to normal gait and lifting activities.   2.Patient goal: be able to walk without right low back pain or right leg pain.   3.Increase strength to 4+/5 in  lower extremities to increase tolerance for ADL and work activities.  4. Pt will report at >/= 73% on FOTO  to demonstrate increase in LE function with every day tasks.    5. Patient will demonstrate negative SLR and slump test to show improvement in neural tension symptoms into lower extremities.  Plan     Continue to progress with centralization of symptoms, addressing lateral and posterior components, functional training and activity modification.    Staci Umanzor, PT DPT  Board Certified in Orthopedic Physical Therapy

## 2024-05-15 ENCOUNTER — CLINICAL SUPPORT (OUTPATIENT)
Dept: REHABILITATION | Facility: HOSPITAL | Age: 39
End: 2024-05-15
Payer: COMMERCIAL

## 2024-05-15 DIAGNOSIS — Z98.890 S/P LUMBAR LAMINECTOMY: Primary | ICD-10-CM

## 2024-05-15 DIAGNOSIS — M53.86 DECREASED RANGE OF MOTION OF LUMBAR SPINE: ICD-10-CM

## 2024-05-15 DIAGNOSIS — M51.26 LUMBAR HERNIATED DISC: ICD-10-CM

## 2024-05-15 PROCEDURE — 97112 NEUROMUSCULAR REEDUCATION: CPT

## 2024-05-15 PROCEDURE — 97140 MANUAL THERAPY 1/> REGIONS: CPT

## 2024-05-15 PROCEDURE — 97110 THERAPEUTIC EXERCISES: CPT

## 2024-05-15 NOTE — PROGRESS NOTES
"  Physical Therapy Daily Treatment Note     Name: Eleazar Casillas  Clinic Number: [id number]    Therapy Diagnosis:   Encounter Diagnoses   Name Primary?    S/P lumbar laminectomy Yes    Decreased range of motion of lumbar spine     Lumbar herniated disc        Physician: Damaris Whitmore NP    Visit Date: 5/15/2024  Physician Orders: PT Eval and Treat   Medical Diagnosis from Referral: S/P lumbar laminectomy [Z98.890], Lumbar herniated disc [M51.26]   Evaluation Date: 4/29/2024  Authorization Period Expiration: 4/16/2024 - 12/31/2024  Plan of Care Expiration: 7/29/24  Progress Note Due: 5/29/24  Date of Surgery: 3/11/24  Post Op Day: 8 weeks, 0 days as of 5/7/24    Visit # / Visits authorized: 5/20   FOTO: 1/ 3  Time In: 8:05 am  Time Out: 9:00 pm  Total Billable Time: 55 minutes    Precautions: Precautions: Standard and Limit Spine Motions to neutral at this time; Bending and Twisting as tolerated    Subjective   Pt reports: he is "not good today." He states that felt good since his last treatment, and was climbing up and down ladders all day yesterday.  Response to previous treatment: improved neural tension   Functional change: Ongoing  Pain: 4/10  Location: Right back and posterior gluteal region, thigh and into calf-region.    Objective   Lumbar Flexion: (-) for concordant pain; pain with return to extension  Lumbar Extension: ~15 degrees, increased pain in the calf region  Straight-Leg Raise: (+) for increased tension gastrocnemius; no longer positive crossed-legged  Observation: Decreased lateral lean with greater right-sided paraspinal mass    Eleazar received therapeutic exercises to develop strength, endurance, ROM, flexibility, posture, and core stabilization for 10 minutes including:  NuStep, 5 minutes, Level 1  Repeated Supine Slump Gliders, 15x, with towel    Eleazar received the following manual therapy techniques: Joint mobilizations were applied for 25 minutes, including:  Manual Traction " "to Lumbar Spine, 10 x 30" hold + Set-Up, Grade III-IV, 4x1 minute  Inferior Hip Distraction Mobilization with Hip Flexion Grade II/III  Supine Thoracic Grade V Manipulation    Eleazar participated in neuromuscular re-education activities to improve: Balance, Coordination, Kinesthetic, Sense, Proprioception, and Posture for 20 minutes. The following activities were included:  TrA Activation with Biofeedback Cuff, 40-50 mmHg, 20 x 10" hold  TrA Activation with Biofeedback Cuff + BKFO with Red Band, 40-50 mmHg, 10x each side  Bridges, 3x8    Home Exercises Provided and Patient Education Provided     Education provided:   - Findings of evaluation and examination, and affect of these on plan for treatment  -Prognosis and expectations  -Role of PT and team-centered care for patient  -Home exercise program and expectations of therapy    Written Home Exercises Provided: yes.  Exercises were reviewed and Eleazar was able to demonstrate them prior to the end of the session.  Eleazar demonstrated good  understanding of the education provided.     See EMR under Patient Instructions for exercises provided prior visit.    Assessment   Eleazar arrives today with greater irritability and pain in his leg today, as well as with some increased discouragement, albeit there is no loss of range of motion, and no return of lateral shifted posture at this visit. He does shift weight away from his right lower extremity at this time, but is able to walk with less antalgic gait pattern upon leaving clinic.     Eleazar Is progressing well towards his goals.   Pt prognosis is Fair.     Pt will continue to benefit from skilled outpatient physical therapy to address the deficits listed in the problem list box on initial evaluation, provide pt/family education and to maximize pt's level of independence in the home and community environment.     Pt's spiritual, cultural and educational needs considered and pt agreeable to plan " of care and goals.     Anticipated Barriers to physical therapy: High level of symptom irritability    Goals: Short Term Goals:  4 weeks  1.Report decreased low back and right leg pain  < / =  3/10  to increase tolerance for progressing with gait.  2. Increase ROM by 10-15 degrees where limited in order to perform ADLs without difficulty.  3. Increase strength by 1/3 MMT grade in lower extremities to increase tolerance for ADL and work activities.  4. Pt to tolerate HEP to improve ROM and independence with ADL's     Long Term Goals: 12 weeks  1.Report decreased low back and right leg pain < / = 1/10  to increase tolerance for return to normal gait and lifting activities.   2.Patient goal: be able to walk without right low back pain or right leg pain.   3.Increase strength to 4+/5 in  lower extremities to increase tolerance for ADL and work activities.  4. Pt will report at >/= 73% on FOTO  to demonstrate increase in LE function with every day tasks.    5. Patient will demonstrate negative SLR and slump test to show improvement in neural tension symptoms into lower extremities.  Plan     Continue to progress with centralization of symptoms, addressing lateral and posterior components, functional training and activity modification.    Staci Umanzor, PT DPT  Board Certified in Orthopedic Physical Therapy

## 2024-05-22 ENCOUNTER — OFFICE VISIT (OUTPATIENT)
Dept: NEUROSURGERY | Facility: CLINIC | Age: 39
End: 2024-05-22
Payer: COMMERCIAL

## 2024-05-22 VITALS — SYSTOLIC BLOOD PRESSURE: 102 MMHG | HEART RATE: 74 BPM | DIASTOLIC BLOOD PRESSURE: 69 MMHG

## 2024-05-22 DIAGNOSIS — Z98.890 S/P LUMBAR LAMINECTOMY: ICD-10-CM

## 2024-05-22 DIAGNOSIS — M54.9 DORSALGIA, UNSPECIFIED: Primary | ICD-10-CM

## 2024-05-22 DIAGNOSIS — M54.16 ACUTE RIGHT LUMBAR RADICULOPATHY: ICD-10-CM

## 2024-05-22 PROCEDURE — 1160F RVW MEDS BY RX/DR IN RCRD: CPT | Mod: CPTII,S$GLB,, | Performed by: NEUROLOGICAL SURGERY

## 2024-05-22 PROCEDURE — 3074F SYST BP LT 130 MM HG: CPT | Mod: CPTII,S$GLB,, | Performed by: NEUROLOGICAL SURGERY

## 2024-05-22 PROCEDURE — 99024 POSTOP FOLLOW-UP VISIT: CPT | Mod: S$GLB,,, | Performed by: NEUROLOGICAL SURGERY

## 2024-05-22 PROCEDURE — 99999 PR PBB SHADOW E&M-EST. PATIENT-LVL III: CPT | Mod: PBBFAC,,, | Performed by: NEUROLOGICAL SURGERY

## 2024-05-22 PROCEDURE — 3078F DIAST BP <80 MM HG: CPT | Mod: CPTII,S$GLB,, | Performed by: NEUROLOGICAL SURGERY

## 2024-05-22 PROCEDURE — 1159F MED LIST DOCD IN RCRD: CPT | Mod: CPTII,S$GLB,, | Performed by: NEUROLOGICAL SURGERY

## 2024-05-22 NOTE — LETTER
May 22, 2024      [facility] Neurosurgery 8th Fl  [address]  [facility] [address]  Phone: [phone]  Fax: [phone]       Patient: Eleazar Casillas   YOB: 1985  Date of Visit: 05/22/2024    To Whom It May Concern:    Faith Casillas  was at [facility] on 05/22/2024. The patient has had a return of significant right leg pain.  We are evaluating it further with studies, which until done, I recommend he remain on light duty until further notice.  He should not climb ladders or carry anything greater than 10 lbs.  If you have any questions or concerns, or if I can be of further assistance, please do not hesitate to contact me.    Sincerely,    Jeff Morris, DO

## 2024-05-22 NOTE — PROGRESS NOTES
CHIEF COMPLAINT:  Recurrent right leg pain  2m postop f/u    INTERVAL HISTORY (5/22/24):  He is now 2m s/p right MIS L5-S1 discectomy for large HNP on 3/11/24.  He presents with severe right leg pain that radiates from his buttock down to his ankle.  The only difference compared to preop as it does not radiate into his toes.  He states that he had good pain relief following surgery for 1 day and then the pain returned.  Pain is triggered with most movements in particular bending.  He was recently seen by my PA who prescribed a steroid pack that did not provide any sustained relief.  He has been doing physical therapy but in limited fashion due to the pain.  He denies any numbness but has some the pain related weakness because he favors the leg.  He had returned to work on a light duty basis but is limited due to the pain.    HPI:  Eleazar Casillas is a 38 y.o.  male with below listed PMH, who is referred for evaluation of acute right leg pain 2/2 large L5-S1 herniated disc.  Pain started approximately 1 month ago but was much less severe.  The pain resided from his buttock down the back of leg to his knee.  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  The pain is severe and shooting.  He has periods where back of his leg and calf are n/a constant Charley horse.  He is unable to straighten his leg.  He has pain with bowel movements and is limited in his mobility.  He has continued to work but has to be careful of his movements.  He has right leg weakness.  MRI shows large L5-S1 HNP compressing S1 nerve root.    FROM PA NOTE:  History of Present Illness: Eleazar Casillas is a 38 y.o. male with hx of C5-6 ACDF with Dr. Morris in 2019. The patient is being seen in clinic today to follow-up on his recent ED visit from 2/12/24 for acute low back and right leg pain. Denies trauma. States that his job requires heavy  lifting but he is uncertain the cause of the pain. Describes the pain as constant and aching down the posterior aspect of the leg. Denies left leg symptoms. Aggravating factors include standing, walking, and bending. Alleviating factors include rest. Denies weakness, b/b dysfunction, saddle anesthesia, or gait instability. Patient has tried Medrol dose gerard, Flexeril, Mobic, Lidoderm patches, and Oxycodone for pain with mild relief.    Review of patient's allergies indicates:  No Known Allergies    Past Medical History:   Diagnosis Date    Cervical herniated disc 9/20/2019    Left cervical radiculopathy 9/21/2019    Numbness and tingling in left hand 9/21/2019    Radiculopathy of cervical spine 9/21/2019     Past Surgical History:   Procedure Laterality Date    ANTERIOR CERVICAL DISCECTOMY W/ FUSION  9/21/2019    Procedure: DISCECTOMY, SPINE, CERVICAL, ANTERIOR APPROACH, WITH FUSION, C5-C6;  Surgeon: Jeff Morris DO;  Location: [facility] OR [address] [place] [address];  Service: Neurosurgery;;    LUMBAR LAMINECTOMY WITH DISCECTOMY Right 3/11/2024    Procedure: LAMINECTOMY, SPINE, LUMBAR, WITH DISCECTOMY;  Surgeon: Jeff Morris DO;  Location: [facility] OR [address] [place] [address];  Service: Neurosurgery;  Laterality: Right;     No family history on file.  Social History     Tobacco Use    Smoking status: Every Day     Current packs/day: 1.00     Types: Cigarettes    Smokeless tobacco: Never   Substance Use Topics    Alcohol use: Not Currently    Drug use: Not Currently     Comment: suboxoe intermittently         Review of Systems   Constitutional: Negative.    Respiratory:  Negative for cough and shortness of breath.    Cardiovascular:  Negative for chest pain, palpitations, claudication and leg swelling.   Gastrointestinal:  Negative for abdominal pain, constipation and diarrhea.   Genitourinary:  Negative for flank pain, frequency and urgency.   Musculoskeletal:  Negative for back pain, falls, joint pain and neck pain.   Skin: Negative.    Neurological:   Negative for dizziness, tingling, tremors, sensory change, speech change, focal weakness, seizures, loss of consciousness, weakness and headaches.   Psychiatric/Behavioral: Negative.         OBJECTIVE:   Vital Signs:  Pulse: 74 (05/22/24 1334)  BP: 102/69 (05/22/24 1334)    Physical Exam:  Constitutional: Patient sitting comfortably in chair. Appears well developed and well nourished.  Skin: Exposed areas are intact without abnormal markings, rashes or other lesions.  HEENT: Normocephalic. Normal conjunctivae.  Cardiovascular: Normal rate and regular rhythm.  Respiratory: Chest wall rises and falls symmetrically, without signs of respiratory distress.  Abdomen: Soft and non-tender.  Extremities: Warm and without edema. Calves supple, non-tender.  Psych/Behavior: Normal affect.    Neurological:    Mental status: Alert and oriented. Conversational and appropriate.       Cranial Nerves: VFF to confrontation. PERRL. EOMI without nystagmus. Facial STLT normal and symmetric. Strong, symmetric muscles of mastication. Facial strength full and symmetric. Hearing equal bilaterally to finger rub. Palate and uvula rise and fall normally in midline. Shoulder shrug 5/5 strength. Tongue midline.     Motor:    Upper:  Deltoids Triceps Biceps WE WF  FA    R 5/5 5/5 5/5 5/5 5/5 5/5 5/5    L 5/5 5/5 5/5 5/5 5/5 5/5 5/5      Lower:  HF KE KF DF PF EHL    R 5/5 5/5 5/5 5/5 5/5 5/5    L 5/5 5/5 5/5 5/5 5/5 5/5     Sensory: Intact sensation to light touch and pinprick in all extremities.     Reflexes:      DTR: 2+ knees and biceps symmetrically.     Cartagena's: Negative.     Babinski's: Negative.     Clonus: Negative.     Gait:  Antalgic, slow and guarded      Spine:    Posture: Head well aligned over pelvis and shoulders in front and side views.  No focal or global spinal deformity visible on inspection.     Cervical:      ROM: Full with flexion, extension, lateral rotation and ear-to-shoulder bend.      Midline TTP: Negative.      Spurling's test: Negative.     Lhermitte's: Negative.    Thoracic:     Midline TTP: Negative    Lumbar:     Midline TTP: Negative     Straight Leg Test: Positive     Crossed Straight Leg Test: Positive    Diagnostic Results:  All imaging was independently reviewed by me.    MRI L spine, dated 3/5/24:  1. Large R L5-S1 HNP causing severe LRS and compressing S1 nerve root    ASSESSMENT/PLAN:     Problem List Items Addressed This Visit          Neuro    Acute right lumbar radiculopathy    S/P lumbar laminectomy     Other Visit Diagnoses       Dorsalgia, unspecified    -  Primary    Relevant Orders    MRI Lumbar Spine Without Contrast            VISIT SUMMARY:  Patient is now approximately 2 months status post right MIS hemilaminotomy for microdiskectomy at L5-S1, who presents with recurrent severe right leg pain that resembles an S1 radiculopathy.  He clearly is limited by the degree of pain which is triggered by most movements particularly bending or straightening his leg.  This makes me concerned that he has a recurrent herniated disc, which would not be surprising given the large size of the extruded disc fragment previously.  I will get an urgent MRI.  I explained that if he has a recurrent herniated disc or a herniated disc at another level, I would recommend surgery.  For the time being I recommend that he continue light duty activities.  He is neurologically intact.    PATIENT EDUCATION:  More than half the clinic visit was spent showing with patient the pertinent findings on imaging and educating the patient about natural history of the pathology.   We discussed options for treatment as well as the risks and benefits of each option.  All questions were answered.     The patient understands and agrees with the following plan of care.    - STAT lumbar MRI (will call)  - Pause PT for now  - Cont light duty work restrictions    Time spent on this encounter: 40 minutes. This includes face-to-face time and non-face  to face time preparing to see the patient (eg, review of tests), obtaining and/or reviewing separately obtained history, documenting clinical information in the electronic or other health record, independently interpreting results and communicating results to the patient/family/caregiver, or care coordinator.            .

## 2024-05-23 ENCOUNTER — HOSPITAL ENCOUNTER (OUTPATIENT)
Dept: RADIOLOGY | Facility: HOSPITAL | Age: 39
Discharge: HOME | End: 2024-05-23
Attending: NEUROLOGICAL SURGERY
Payer: COMMERCIAL

## 2024-05-23 DIAGNOSIS — M54.9 DORSALGIA, UNSPECIFIED: ICD-10-CM

## 2024-05-23 PROCEDURE — 72148 MRI LUMBAR SPINE W/O DYE: CPT | Mod: 26,,, | Performed by: RADIOLOGY

## 2024-05-23 PROCEDURE — 72148 MRI LUMBAR SPINE W/O DYE: CPT | Mod: TC

## 2024-05-24 ENCOUNTER — PATIENT MESSAGE (OUTPATIENT)
Dept: NEUROSURGERY | Facility: CLINIC | Age: 39
End: 2024-05-24
Payer: COMMERCIAL

## 2024-06-07 ENCOUNTER — OFFICE VISIT (OUTPATIENT)
Dept: NEUROSURGERY | Facility: CLINIC | Age: 39
End: 2024-06-07
Payer: COMMERCIAL

## 2024-06-07 ENCOUNTER — TELEPHONE (OUTPATIENT)
Dept: PAIN MEDICINE | Facility: CLINIC | Age: 39
End: 2024-06-07
Payer: COMMERCIAL

## 2024-06-07 VITALS — HEART RATE: 83 BPM | DIASTOLIC BLOOD PRESSURE: 68 MMHG | SYSTOLIC BLOOD PRESSURE: 113 MMHG

## 2024-06-07 DIAGNOSIS — Z98.890 S/P LUMBAR LAMINECTOMY: ICD-10-CM

## 2024-06-07 DIAGNOSIS — M54.16 LUMBAR RADICULOPATHY: Primary | ICD-10-CM

## 2024-06-07 DIAGNOSIS — M54.16 ACUTE RIGHT LUMBAR RADICULOPATHY: Primary | ICD-10-CM

## 2024-06-07 PROCEDURE — 1159F MED LIST DOCD IN RCRD: CPT | Mod: CPTII,S$GLB,, | Performed by: NEUROLOGICAL SURGERY

## 2024-06-07 PROCEDURE — 99215 OFFICE O/P EST HI 40 MIN: CPT | Mod: S$GLB,,, | Performed by: NEUROLOGICAL SURGERY

## 2024-06-07 PROCEDURE — 99999 PR PBB SHADOW E&M-EST. PATIENT-LVL III: CPT | Mod: PBBFAC,,, | Performed by: NEUROLOGICAL SURGERY

## 2024-06-07 PROCEDURE — 3078F DIAST BP <80 MM HG: CPT | Mod: CPTII,S$GLB,, | Performed by: NEUROLOGICAL SURGERY

## 2024-06-07 PROCEDURE — 1160F RVW MEDS BY RX/DR IN RCRD: CPT | Mod: CPTII,S$GLB,, | Performed by: NEUROLOGICAL SURGERY

## 2024-06-07 PROCEDURE — 3074F SYST BP LT 130 MM HG: CPT | Mod: CPTII,S$GLB,, | Performed by: NEUROLOGICAL SURGERY

## 2024-06-08 NOTE — TELEPHONE ENCOUNTER
"Patient is s/p microdiscectomy at L5-S1 with Dr. Morris but had reherniation of the disc. New MRI shows "Postsurgical change of discectomy with interval resolution of the previous central/right subarticular extrusion. Circumferential disc bulge with a recurrent posterior central/right subarticular broad-based protrusion that causes mass effect on the right lateral recess and abuts and displaces the right descending S1 nerve root. ".  Dr. Morris and the patient are requesting epidural before considering another surgery.  Will place order for Right L5-S1 TFESI earliest available to expedite it.    "

## 2024-06-08 NOTE — TELEPHONE ENCOUNTER
----- Message from Jeff Morris DO sent at 6/7/2024  2:55 PM CDT -----  I performed a minimally invasive L5-S1 diskectomy in March for a large herniated disc.  Unfortunately it appears that he reherniated the disc and has had a return of his radiculopathy, which is greatly affecting his ability to work.  I offered him surgery to remove the re-herniated disc but he is very reluctant.  He would like to try an epidural steroid injection 1st and if it does not work then he would be more comfortable with proceeding with surgery.    My question to you is could I place an order for the injection so that we can expedite the process?    Thanks  Didier

## 2024-06-10 ENCOUNTER — TELEPHONE (OUTPATIENT)
Dept: PAIN MEDICINE | Facility: CLINIC | Age: 39
End: 2024-06-10
Payer: COMMERCIAL

## 2024-06-10 DIAGNOSIS — M54.16 LUMBAR RADICULOPATHY: Primary | ICD-10-CM

## 2024-06-10 NOTE — TELEPHONE ENCOUNTER
----- Message from Jeff Morris DO sent at 2024  3:00 PM CDT -----  Regarding: Order for JAMA GLASS    Patient Name: JAMA GLASS([id number])  Sex: Male  : 1985      PCP: [facility]    Center: [facility] CENTRAL BILLING OFFICE     Types of orders made on 2024: Procedure Request    Order Date:2024  Ordering User:JEFF MORRIS [[id number]]  Encounter Provider:Jeff Morris DO [[id number]]  Authorizing Pro  vider: Jeff Morris DO [[id number]]  Department:[facility] NEUROSURGERY 8TH FLOOR[[id number]]    Common Order Information  Procedure -> Epidural Injection (specify level) Cmt: Right sided L5-S1    Pre-op Diagnosis -> Radiculopathy due to lumbar intervertebral disc disorder     Order Specific Information  Order: Procedure Order to Pain Management [Custom: [id number]]  Order #:          [id number]: 1 FUTURE    Priority: ASAP    Class: Clinic Performed    Future Order Information      Expires on:2025            Expected by:2024                   Associated Diagnoses      Z98.890 S/P lumbar laminectomy      M54.16 Acute right lumbar radiculopathy      Physician -> Yomi Rowan         Is patient on anti-coagulants? -> No         Fac[facility]           Priority: ASAP  Class: Clinic Perfo  rmed    Future Order Information      Expires on:2025            Expected by:2024                   Associated Diagnoses      Z98.890 S/P lumbar laminectomy      M54.16 Acute right lumbar radiculopathy      Procedure -> Epidural Injection (specify level) Cmt: Right sided L5-S1        Physician -> Yomi Rowan         Is patient on anti-coagulants? -> No         Pre-op Diagnosis -> Radiculopathy due to lum  bar intervertebral disc                        disorder         Fac[facility] Name: [facility]

## 2024-06-11 ENCOUNTER — TELEPHONE (OUTPATIENT)
Dept: PAIN MEDICINE | Facility: CLINIC | Age: 39
End: 2024-06-11
Payer: COMMERCIAL

## 2024-06-11 NOTE — TELEPHONE ENCOUNTER
----- Message from Joya Astudillo sent at 6/11/2024 11:57 AM CDT -----  Regarding: Patient Advice                    Name of Who is Calling:  JAMA GLASS    Who Left The Message:  JAMA GLASS      What is the request in detail:        Patient called requesting to cancel his procedure that's scheduled with Dr. CRISTINA Woodruff MD on Friday 06/14/2024.  Please further advise.  Thank you      Can the clinic reply by [facility]:  No      What [facility]:  [phone]

## 2024-06-12 ENCOUNTER — TELEPHONE (OUTPATIENT)
Dept: NEUROSURGERY | Facility: CLINIC | Age: 39
End: 2024-06-12
Payer: COMMERCIAL

## 2024-06-12 NOTE — TELEPHONE ENCOUNTER
"Called pt back. Confirmed he would like to hold off on surgery for now and will contact us if he wants to reschedule.    ----- Message from Shalini Uribe sent at 6/12/2024 11:44 AM CDT -----  Regarding: pt advice  Contact: [phone]  .Name Of Caller: Self     Contact Preference?: [phone]     What is the nature of the call?:Pt needing to let nurse Paris know pt cancelling his surgery 6/24/24, pls call      Additional Notes:  "Thank you for all that you do for our patients"  "

## 2024-10-09 ENCOUNTER — OFFICE VISIT (OUTPATIENT)
Dept: URGENT CARE | Facility: CLINIC | Age: 39
End: 2024-10-09
Payer: COMMERCIAL

## 2024-10-09 VITALS
WEIGHT: 135 LBS | BODY MASS INDEX: 19.99 KG/M2 | RESPIRATION RATE: 17 BRPM | TEMPERATURE: 98 F | HEIGHT: 69 IN | HEART RATE: 92 BPM | SYSTOLIC BLOOD PRESSURE: 104 MMHG | DIASTOLIC BLOOD PRESSURE: 69 MMHG | OXYGEN SATURATION: 98 %

## 2024-10-09 DIAGNOSIS — J18.9 ATYPICAL PNEUMONIA: ICD-10-CM

## 2024-10-09 DIAGNOSIS — R05.9 COUGH, UNSPECIFIED TYPE: ICD-10-CM

## 2024-10-09 DIAGNOSIS — J31.0 RHINITIS, UNSPECIFIED TYPE: ICD-10-CM

## 2024-10-09 DIAGNOSIS — R50.9 FEVER AND CHILLS: Primary | ICD-10-CM

## 2024-10-09 LAB
CTP QC/QA: YES
MOLECULAR STREP A: NEGATIVE
POC MOLECULAR INFLUENZA A AGN: NEGATIVE
POC MOLECULAR INFLUENZA B AGN: NEGATIVE
SARS-COV-2 AG RESP QL IA.RAPID: NEGATIVE

## 2024-10-09 PROCEDURE — 87502 INFLUENZA DNA AMP PROBE: CPT | Mod: QW,S$GLB,, | Performed by: FAMILY MEDICINE

## 2024-10-09 PROCEDURE — 87651 STREP A DNA AMP PROBE: CPT | Mod: QW,S$GLB,, | Performed by: FAMILY MEDICINE

## 2024-10-09 PROCEDURE — 87811 SARS-COV-2 COVID19 W/OPTIC: CPT | Mod: QW,S$GLB,, | Performed by: FAMILY MEDICINE

## 2024-10-09 PROCEDURE — 99204 OFFICE O/P NEW MOD 45 MIN: CPT | Mod: S$GLB,,, | Performed by: FAMILY MEDICINE

## 2024-10-09 RX ORDER — DOXYCYCLINE HYCLATE 100 MG
100 TABLET ORAL 2 TIMES DAILY
Qty: 20 TABLET | Refills: 0 | Status: SHIPPED | OUTPATIENT
Start: 2024-10-09

## 2024-10-09 RX ORDER — IPRATROPIUM BROMIDE 21 UG/1
2 SPRAY, METERED NASAL 2 TIMES DAILY PRN
Qty: 30 ML | Refills: 0 | Status: SHIPPED | OUTPATIENT
Start: 2024-10-09

## 2024-10-09 RX ORDER — BENZONATATE 200 MG/1
200 CAPSULE ORAL 3 TIMES DAILY PRN
Qty: 30 CAPSULE | Refills: 0 | Status: SHIPPED | OUTPATIENT
Start: 2024-10-09 | End: 2024-10-19

## 2024-10-09 NOTE — PROGRESS NOTES
Subjective:      Patient ID: Eleazar Casillas is a 38 y.o. male.    Vitals:  vitals were not taken for this visit.     Chief Complaint: Sinus Problem    This patient complains of     Sinus Problem  This is a new problem. The problem is unchanged.   ROS   Objective:     Physical Exam    Assessment:     No diagnosis found.    Plan:       There are no diagnoses linked to this encounter.

## 2024-10-09 NOTE — PROGRESS NOTES
"Subjective:      Patient ID: Eleazar Casillas is a 38 y.o. male.    Vitals:  height is 5' 9" (1.753 m) and weight is 61.2 kg (135 lb). His oral temperature is 98.2 °F (36.8 °C). His blood pressure is 104/69 and his pulse is 92. His respiration is 17 and oxygen saturation is 98%.     Chief Complaint: Sinus Problem    This is a 38 y.o. male who presents today with a chief complaint of cough, congestion, sx started over a week ago, patient states he is taking mucinex and zyrtec.    Sinus Problem  This is a new problem. The current episode started in the past 7 days. The problem is unchanged. There has been no fever. He is experiencing no pain. Associated symptoms include congestion, headaches, a hoarse voice, sinus pressure and sneezing. Pertinent negatives include no chills, coughing, diaphoresis, ear pain, neck pain, shortness of breath, sore throat or swollen glands.     Constitution: Negative for chills and sweating.   HENT:  Positive for congestion and sinus pressure. Negative for ear pain and sore throat.    Neck: Negative for neck pain.   Respiratory:  Negative for cough and shortness of breath.    Allergic/Immunologic: Positive for sneezing.   Neurological:  Positive for headaches.    Objective:     Physical Exam   Constitutional: He is oriented to person, place, and time. normal  HENT:   Head: Normocephalic and atraumatic.   Ears:   Right Ear: Tympanic membrane, external ear and ear canal normal.   Left Ear: Tympanic membrane, external ear and ear canal normal.   Mouth/Throat: Mucous membranes are moist. Oropharynx is clear.   Eyes: Conjunctivae are normal. Pupils are equal, round, and reactive to light. Extraocular movement intact   Neck: Neck supple.   Cardiovascular: Normal rate, regular rhythm and normal pulses.   Pulmonary/Chest: Effort normal. No stridor. No respiratory distress. He has rhonchi.   Abdominal: Normal appearance and bowel sounds are normal. Soft.   Musculoskeletal: Normal range of motion.  "        General: Normal range of motion.   Neurological: no focal deficit. He is alert, oriented to person, place, and time and at baseline.   Skin: Skin is warm.   Psychiatric: His behavior is normal. Mood, judgment and thought content normal.   Nursing note and vitals reviewed.    Assessment:     Plan:   1. Fever and chills  - SARS Coronavirus 2 Antigen, POCT Manual Read  - POCT Influenza A/B MOLECULAR  - POCT Strep A, Molecular    2. Atypical pneumonia  - doxycycline (VIBRA-TABS) 100 MG tablet; Take 1 tablet (100 mg total) by mouth 2 (two) times daily.  Dispense: 20 tablet; Refill: 0    3. Rhinitis, unspecified type  - ipratropium (ATROVENT) 21 mcg (0.03 %) nasal spray; 2 sprays by Each Nostril route 2 (two) times daily as needed.  Dispense: 30 mL; Refill: 0    4. Cough, unspecified type  - benzonatate (TESSALON) 200 MG capsule; Take 1 capsule (200 mg total) by mouth 3 (three) times daily as needed.  Dispense: 30 capsule; Refill: 0   All results discussed with pt prior to discharge

## 2025-02-09 ENCOUNTER — OFFICE VISIT (OUTPATIENT)
Dept: URGENT CARE | Facility: CLINIC | Age: 40
End: 2025-02-09
Payer: COMMERCIAL

## 2025-02-09 VITALS
DIASTOLIC BLOOD PRESSURE: 58 MMHG | WEIGHT: 135 LBS | SYSTOLIC BLOOD PRESSURE: 94 MMHG | HEIGHT: 69 IN | OXYGEN SATURATION: 98 % | HEART RATE: 73 BPM | RESPIRATION RATE: 16 BRPM | TEMPERATURE: 99 F | BODY MASS INDEX: 19.99 KG/M2

## 2025-02-09 DIAGNOSIS — R49.0 HOARSE VOICE QUALITY: ICD-10-CM

## 2025-02-09 DIAGNOSIS — J35.1 ENLARGED TONSILS: ICD-10-CM

## 2025-02-09 DIAGNOSIS — J02.9 PHARYNGITIS, UNSPECIFIED ETIOLOGY: Primary | ICD-10-CM

## 2025-02-09 DIAGNOSIS — J02.9 SORE THROAT: ICD-10-CM

## 2025-02-09 LAB
CTP QC/QA: YES
MOLECULAR STREP A: NEGATIVE

## 2025-02-09 PROCEDURE — 87651 STREP A DNA AMP PROBE: CPT | Mod: QW,S$GLB,,

## 2025-02-09 PROCEDURE — 99203 OFFICE O/P NEW LOW 30 MIN: CPT | Mod: 25,S$GLB,,

## 2025-02-09 PROCEDURE — 96372 THER/PROPH/DIAG INJ SC/IM: CPT | Mod: S$GLB,,, | Performed by: FAMILY MEDICINE

## 2025-02-09 RX ORDER — CYCLOBENZAPRINE HCL 10 MG
10 TABLET ORAL 2 TIMES DAILY PRN
COMMUNITY
Start: 2024-11-12

## 2025-02-09 RX ORDER — DEXAMETHASONE SODIUM PHOSPHATE 10 MG/ML
10 INJECTION INTRAMUSCULAR; INTRAVENOUS
Status: COMPLETED | OUTPATIENT
Start: 2025-02-09 | End: 2025-02-09

## 2025-02-09 RX ADMIN — DEXAMETHASONE SODIUM PHOSPHATE 10 MG: 10 INJECTION INTRAMUSCULAR; INTRAVENOUS at 01:02

## 2025-02-09 NOTE — PATIENT INSTRUCTIONS
Your illness is caused by a virus and antibiotics would not be beneficial at this time.    Please drink plenty of fluids and get plenty of rest.    For Congestion:     --  Take over the counter antihistamine such as Claritin, Zyrtec or Allegra to dry you out.     --  Use pseudoephedrine (from behind the pharmacy counter) for decongestant.  You may start with a low dose (30 mg) with a max dose of 240 mg /day.  This medication may raise your blood pressure and give you palpitations, if this occurs, please lower your daily dose or stop taking the medication.     --  Use Flonase 2 sprays/nostril twice per day. It is a local acting steroid nasal spray and will take 3-4 days to work at full strength so please use this consistently.  If you develop a bloody nose, stop using the medication immediately.      For Cough:   --  Use Mucinex (guaifenisin) to break up mucous up to 2400mg/day to loosen any mucous. The Mucinex DM pill has a cough suppressant that can be sedating. It can be used at night to stop the tickle at the back of your throat.        --  May gargle salt water for sore throat, a tablespoon of honey is helpful for cough, especially at night.     If not allergic, please take over the counter Tylenol (Acetaminophen) and/or Motrin (Ibuprofen) as directed for control of pain and/or fever.    Please follow up with your primary care doctor or specialist as needed.    - You must understand that you have received an Urgent Care treatment only and that you may be released before all of your medical problems are known or treated.   - You, the patient, will arrange for follow up care as instructed.   - If your condition worsens or fails to improve we recommend that you receive another evaluation at the ER immediately or contact your PCP to discuss your concerns or return here.   - Follow up with your PCP or specialty clinic as directed in the next 1-2 weeks if not improved or as needed.  You can call [phone] to  schedule an appointment with the appropriate provider.      If your symptoms do not improve or worsen, go to the emergency room immediately.

## 2025-02-09 NOTE — PROGRESS NOTES
"Subjective:      Patient ID: Eleazar Casillas is a 39 y.o. male.    Vitals:  height is 5' 9" (1.753 m) and weight is 61.2 kg (135 lb). His oral temperature is 98.5 °F (36.9 °C). His blood pressure is 94/58 (abnormal) and his pulse is 73. His respiration is 16 and oxygen saturation is 98%.     Chief Complaint: Sore Throat    Pt states sore throat for 4 days, patient states he has also been losing his voice.  Patient states he did have congestion and some cough a few days ago, all other symptoms have now resolved and it is just the throat pain that has persisted.  Denies any fever, body aches, ear pain, GI symptoms, or other associated complaints.    Sore Throat   This is a new problem. The current episode started in the past 7 days. The problem has been gradually worsening. Pertinent negatives include no abdominal pain, coughing, ear pain, headaches, neck pain or shortness of breath.       Constitution: Negative for fever and generalized weakness.   HENT:  Positive for sore throat. Negative for ear pain and sinus pain.    Neck: Negative for neck pain.   Cardiovascular:  Negative for chest pain.   Respiratory:  Negative for cough and shortness of breath.    Gastrointestinal:  Negative for abdominal pain.   Neurological:  Negative for headaches.      Objective:     Physical Exam   Constitutional: He is oriented to person, place, and time. He appears well-developed. He is cooperative.  Non-toxic appearance. He does not appear ill. No distress.   HENT:   Head: Normocephalic and atraumatic.   Ears:   Right Ear: Hearing, tympanic membrane, external ear and ear canal normal.   Left Ear: Hearing, tympanic membrane, external ear and ear canal normal.   Nose: Nose normal. No mucosal edema, rhinorrhea or nasal deformity. No epistaxis. Right sinus exhibits no maxillary sinus tenderness and no frontal sinus tenderness. Left sinus exhibits no maxillary sinus tenderness and no frontal sinus tenderness.   Mouth/Throat: Uvula is " midline and mucous membranes are normal. No trismus in the jaw. Normal dentition. No uvula swelling. Posterior oropharyngeal edema (Mild) and posterior oropharyngeal erythema present. No oropharyngeal exudate. Tonsils are 2+ on the right. Tonsils are 2+ on the left. No tonsillar exudate.   Eyes: Conjunctivae and lids are normal. No scleral icterus.   Neck: Trachea normal and phonation normal. Neck supple. No edema present. No erythema present. No neck rigidity present.   Cardiovascular: Normal rate, regular rhythm, normal heart sounds and normal pulses.   Pulmonary/Chest: Effort normal and breath sounds normal. No respiratory distress. He has no decreased breath sounds. He has no rhonchi.   Abdominal: Normal appearance.   Musculoskeletal: Normal range of motion.         General: No deformity. Normal range of motion.   Neurological: He is alert and oriented to person, place, and time. He exhibits normal muscle tone. Coordination normal.   Skin: Skin is warm, dry, intact, not diaphoretic and not pale.   Psychiatric: His speech is normal and behavior is normal. Judgment and thought content normal.   Nursing note and vitals reviewed.      Assessment:     1. Pharyngitis, unspecified etiology    2. Sore throat    3. Enlarged tonsils    4. Hoarse voice quality        Plan:   Pharyngitis, likely viral in etiology.  Strep testing negative.  Enlarged tonsils on physical exam, as well as a hoarse voice.  We will give IM dexamethasone to reduce inflammation.  Discussed other supportive care with patient, he acknowledges understanding.    Results for orders placed or performed in visit on 02/09/25   POCT Strep A, Molecular    Collection Time: 02/09/25 12:40 PM   Result Value Ref Range    Molecular Strep A, POC Negative Negative     Acceptable Yes          Pharyngitis, unspecified etiology    Sore throat  -     POCT Strep A, Molecular    Enlarged tonsils  -     dexAMETHasone injection 10 mg    Hoarse voice  quality  -     dexAMETHasone injection 10 mg

## 2025-08-07 ENCOUNTER — OFFICE VISIT (OUTPATIENT)
Dept: URGENT CARE | Facility: CLINIC | Age: 40
End: 2025-08-07
Payer: COMMERCIAL

## 2025-08-07 VITALS
RESPIRATION RATE: 16 BRPM | BODY MASS INDEX: 19.99 KG/M2 | HEIGHT: 69 IN | TEMPERATURE: 99 F | SYSTOLIC BLOOD PRESSURE: 96 MMHG | OXYGEN SATURATION: 98 % | HEART RATE: 95 BPM | WEIGHT: 135 LBS | DIASTOLIC BLOOD PRESSURE: 66 MMHG

## 2025-08-07 DIAGNOSIS — K59.00 CONSTIPATION, UNSPECIFIED CONSTIPATION TYPE: ICD-10-CM

## 2025-08-07 DIAGNOSIS — R11.2 NAUSEA AND VOMITING, UNSPECIFIED VOMITING TYPE: Primary | ICD-10-CM

## 2025-08-07 DIAGNOSIS — K52.9 GASTROENTERITIS: ICD-10-CM

## 2025-08-07 LAB
BILIRUBIN, UA POC OHS: ABNORMAL
BLOOD, UA POC OHS: NEGATIVE
CLARITY, UA POC OHS: CLEAR
COLOR, UA POC OHS: ABNORMAL
CTP QC/QA: YES
CTP QC/QA: YES
GLUCOSE, UA POC OHS: NEGATIVE
KETONES, UA POC OHS: NEGATIVE
LEUKOCYTES, UA POC OHS: NEGATIVE
NITRITE, UA POC OHS: NEGATIVE
PH, UA POC OHS: 6
POC MOLECULAR INFLUENZA A AGN: NEGATIVE
POC MOLECULAR INFLUENZA B AGN: NEGATIVE
PROTEIN, UA POC OHS: 100
SARS-COV+SARS-COV-2 AG RESP QL IA.RAPID: NEGATIVE
SPECIFIC GRAVITY, UA POC OHS: >=1.03
UROBILINOGEN, UA POC OHS: 0.2

## 2025-08-07 PROCEDURE — 74019 RADEX ABDOMEN 2 VIEWS: CPT | Mod: S$GLB,,, | Performed by: RADIOLOGY

## 2025-08-07 PROCEDURE — 87502 INFLUENZA DNA AMP PROBE: CPT | Mod: QW,S$GLB,, | Performed by: FAMILY MEDICINE

## 2025-08-07 RX ORDER — ONDANSETRON 4 MG/1
4 TABLET, ORALLY DISINTEGRATING ORAL EVERY 12 HOURS PRN
Qty: 12 TABLET | Refills: 0 | Status: SHIPPED | OUTPATIENT
Start: 2025-08-07 | End: 2025-08-07

## 2025-08-07 RX ORDER — ONDANSETRON 4 MG/1
4 TABLET, ORALLY DISINTEGRATING ORAL EVERY 12 HOURS PRN
Qty: 12 TABLET | Refills: 0 | Status: SHIPPED | OUTPATIENT
Start: 2025-08-07

## 2025-08-07 RX ORDER — ONDANSETRON 2 MG/ML
4 INJECTION INTRAMUSCULAR; INTRAVENOUS ONCE
Status: COMPLETED | OUTPATIENT
Start: 2025-08-07 | End: 2025-08-07

## 2025-08-07 RX ORDER — ONDANSETRON 2 MG/ML
4 INJECTION INTRAMUSCULAR; INTRAVENOUS ONCE
Status: DISCONTINUED | OUTPATIENT
Start: 2025-08-07 | End: 2025-08-07

## 2025-08-07 RX ADMIN — ONDANSETRON 4 MG: 2 INJECTION INTRAMUSCULAR; INTRAVENOUS at 02:08

## 2025-08-07 NOTE — PROGRESS NOTES
"Subjective:      Patient ID: Eleazar Casillas is a 39 y.o. male.    Vitals:  height is 5' 9" (1.753 m) and weight is 61.2 kg (135 lb). His oral temperature is 98.5 °F (36.9 °C). His blood pressure is 103/71 and his pulse is 101. His respiration is 16 and oxygen saturation is 98%.     Chief Complaint: Nausea    This is a 39 y.o. male who presents today with a chief complaint of unable to keep food and drinks down. Symptoms started Sunday. Pt had a fever of 100.0. last thing that the patient ate was a turkey sandwich and a salad on Monday. Tuesday had chills.       Nausea  This is a new problem. The current episode started 1 to 4 weeks ago. The problem occurs constantly. The problem has been unchanged. Associated symptoms include a fever, nausea and vomiting.       Constitution: Positive for fever.   Gastrointestinal:  Positive for nausea and vomiting.   Skin:  Negative for erythema.      Objective:     Physical Exam   Constitutional: He is oriented to person, place, and time. No distress. obesity  HENT:   Head: Normocephalic and atraumatic.   Ears:   Right Ear: Tympanic membrane, external ear and ear canal normal.   Left Ear: Tympanic membrane, external ear and ear canal normal.   Nose: Rhinorrhea and congestion present.   Mouth/Throat: Mucous membranes are moist. No oropharyngeal exudate or posterior oropharyngeal erythema. Oropharynx is clear.   Eyes: Conjunctivae are normal. Pupils are equal, round, and reactive to light. Extraocular movement intact   Neck: Neck supple.   Cardiovascular: Normal rate, regular rhythm, normal heart sounds and normal pulses.   Pulmonary/Chest: Effort normal and breath sounds normal. No respiratory distress.   Abdominal: Normal appearance and bowel sounds are normal. Soft. flat abdomen There is no abdominal tenderness.   Musculoskeletal: Normal range of motion.         General: No deformity. Normal range of motion.   Neurological: no focal deficit. He is alert, oriented to person, " place, and time and at baseline.   Skin: Skin is warm and dry. Capillary refill takes less than 2 seconds. No bruising and No erythema   Psychiatric: His behavior is normal. Mood and thought content normal.   Nursing note and vitals reviewed.    Assessment:     Plan:   1. Nausea and vomiting, unspecified vomiting type  - POCT Influenza A/B MOLECULAR  - SARS Coronavirus 2 Antigen, POCT Manual Read  - Orthostatic vital signs  - POCT Urinalysis(Instrument)  - XR ABDOMEN FLAT AND ERECT; Future  - ondansetron (ZOFRAN-ODT) 4 MG TbDL; Take 1 tablet (4 mg total) by mouth every 12 (twelve) hours as needed.  Dispense: 12 tablet; Refill: 0  - ondansetron injection 4 mg  - lactulose (CHRONULAC) 20 gram/30 mL Soln; Take 15 mLs (10 g total) by mouth 2 (two) times daily. for 5 days  Dispense: 150 mL; Refill: 0    2. Constipation, unspecified constipation type  - lactulose (CHRONULAC) 20 gram/30 mL Soln; Take 15 mLs (10 g total) by mouth 2 (two) times daily. for 5 days  Dispense: 150 mL; Refill: 0   All results discussed with pt prior to discharge from clinic

## 2025-08-08 PROBLEM — R11.2 NAUSEA AND VOMITING: Status: ACTIVE | Noted: 2025-08-08

## 2025-08-08 PROBLEM — K52.9 GASTROENTERITIS: Status: ACTIVE | Noted: 2025-08-08

## 2025-08-08 PROBLEM — K59.00 CONSTIPATION: Status: ACTIVE | Noted: 2025-08-08

## (undated) DEVICE — STAPLER SKIN PROXIMATE WIDE

## (undated) DEVICE — GAUZE SPONGE 4X4 12PLY

## (undated) DEVICE — CORD BIPOLAR 12 FOOT

## (undated) DEVICE — DRAPE C-ARM ELAS CLIP 42X120IN

## (undated) DEVICE — MARKER SKIN STND TIP BLUE BARR

## (undated) DEVICE — DRAPE TOP 53X102IN

## (undated) DEVICE — DRAPE STERI INSTRUMENT 1018

## (undated) DEVICE — TRAY NEURO OMC

## (undated) DEVICE — GOWN POLY REINF BRTH SLV XL

## (undated) DEVICE — NDL 18GA X1 1/2 REG BEVEL

## (undated) DEVICE — DURAPREP SURG SCRUB 26ML

## (undated) DEVICE — SUT MCRYL PLUS 4-0 PS2 27IN

## (undated) DEVICE — DRAPE C-ARMOR EQUIPMENT COVER

## (undated) DEVICE — SUT VICRYL PLUS 0 CT1 18IN

## (undated) DEVICE — DRAPE INCISE IOBAN 2 23X17IN

## (undated) DEVICE — SEE MEDLINE ITEM 156905

## (undated) DEVICE — KIT SURGIFLO HEMOSTATIC MATRIX

## (undated) DEVICE — TUBE FRAZIER 5MM 2FT SOFT TIP

## (undated) DEVICE — DRESSING AQUACEL FOAM 5 X 5

## (undated) DEVICE — DRESSING MEPILEX FLEX 3X3IN

## (undated) DEVICE — NDL SAFETY 22G X 1.5 ECLIPSE

## (undated) DEVICE — SUT VICRYL PLUS 3-0 SH 18IN

## (undated) DEVICE — DIFFUSER

## (undated) DEVICE — BUR BONE CUT MICRO TPS 3X3.8MM

## (undated) DEVICE — CATH SUCTION 10FR

## (undated) DEVICE — DRESSING MEPILEX BORDER 4 X 4

## (undated) DEVICE — SEE MEDLINE ITEM 157194

## (undated) DEVICE — NDL SPINAL 18GX3.5 SPINOCAN

## (undated) DEVICE — GAUZE SPONGE PEANUT STRL

## (undated) DEVICE — SUT CTD VICRYL 3-0 CR/SH

## (undated) DEVICE — SYR 30CC LUER LOCK

## (undated) DEVICE — SUT VICRYL PLUS 3-0 FS1 27

## (undated) DEVICE — DRAPE LAP T SHT W/ INSTR PAD

## (undated) DEVICE — SPONGE PATTY SURGICAL .5X3IN

## (undated) DEVICE — DRESSING MEPILEX SACR 22X25CM

## (undated) DEVICE — DRESSING ADHESIVE ISLAND 3 X 6

## (undated) DEVICE — FRAZIER 18FR

## (undated) DEVICE — DRAPE THREE-QTR REINF 53X77IN

## (undated) DEVICE — ELECTRODE REM PLYHSV RETURN 9

## (undated) DEVICE — DRAPE STERI-DRAPE 1000 17X11IN

## (undated) DEVICE — PACK SET UP CONVERTORS

## (undated) DEVICE — CARTRIDGE OIL

## (undated) DEVICE — SPONGE KITTNER 1/4X 5/8 L STRL

## (undated) DEVICE — DRESSING SURGICAL 1/2X1/2

## (undated) DEVICE — SUT VICRYL PLUS 2-0 CT1 18

## (undated) DEVICE — PIN DISTRACTOR 14MM
Type: IMPLANTABLE DEVICE | Site: SPINE CERVICAL | Status: NON-FUNCTIONAL
Removed: 2019-09-21

## (undated) DEVICE — KIT SPINAL PATIENT CARE JACK

## (undated) DEVICE — TRAY CATH FOL SIL URIMTR 16FR

## (undated) DEVICE — ELECTRODE BLADE INSULATED 1 IN

## (undated) DEVICE — ADHESIVE DERMABOND ADVANCED

## (undated) DEVICE — COVER PROXIMA MAYO STAND

## (undated) DEVICE — DRAPE C ARM 42 X 120 10/BX

## (undated) DEVICE — DRESSING ABSRBNT ISLAND 3.6X8

## (undated) DEVICE — DRAPE THYROID WITH ARMBOARD

## (undated) DEVICE — TRAY FOLEY 16FR INFECTION CONT

## (undated) DEVICE — DRAPE OPMI STERILE